# Patient Record
Sex: FEMALE | Race: ASIAN | NOT HISPANIC OR LATINO | URBAN - METROPOLITAN AREA
[De-identification: names, ages, dates, MRNs, and addresses within clinical notes are randomized per-mention and may not be internally consistent; named-entity substitution may affect disease eponyms.]

---

## 2017-01-20 ENCOUNTER — COMMUNICATION - HEALTHEAST (OUTPATIENT)
Dept: FAMILY MEDICINE | Facility: CLINIC | Age: 82
End: 2017-01-20

## 2017-01-20 DIAGNOSIS — K59.00 UNSPECIFIED CONSTIPATION: ICD-10-CM

## 2017-02-12 ENCOUNTER — COMMUNICATION - HEALTHEAST (OUTPATIENT)
Dept: FAMILY MEDICINE | Facility: CLINIC | Age: 82
End: 2017-02-12

## 2017-02-21 ENCOUNTER — OFFICE VISIT - HEALTHEAST (OUTPATIENT)
Dept: FAMILY MEDICINE | Facility: CLINIC | Age: 82
End: 2017-02-21

## 2017-02-21 DIAGNOSIS — R50.9 FEVER: ICD-10-CM

## 2017-02-21 DIAGNOSIS — R11.0 NAUSEA: ICD-10-CM

## 2017-02-21 DIAGNOSIS — K52.9 GASTROENTERITIS: ICD-10-CM

## 2017-02-21 DIAGNOSIS — B34.9 VIRAL INFECTION: ICD-10-CM

## 2017-02-21 ASSESSMENT — MIFFLIN-ST. JEOR: SCORE: 676.75

## 2017-02-23 ENCOUNTER — OFFICE VISIT - HEALTHEAST (OUTPATIENT)
Dept: FAMILY MEDICINE | Facility: CLINIC | Age: 82
End: 2017-02-23

## 2017-02-23 DIAGNOSIS — I10 HTN (HYPERTENSION): ICD-10-CM

## 2017-02-23 DIAGNOSIS — K52.9 GASTROENTERITIS: ICD-10-CM

## 2017-02-23 DIAGNOSIS — R11.0 NAUSEA: ICD-10-CM

## 2017-02-23 ASSESSMENT — MIFFLIN-ST. JEOR: SCORE: 682.19

## 2017-03-23 ENCOUNTER — OFFICE VISIT - HEALTHEAST (OUTPATIENT)
Dept: FAMILY MEDICINE | Facility: CLINIC | Age: 82
End: 2017-03-23

## 2017-03-23 DIAGNOSIS — D64.9 ANEMIA, UNSPECIFIED: ICD-10-CM

## 2017-03-23 DIAGNOSIS — R11.0 NAUSEA: ICD-10-CM

## 2017-03-23 DIAGNOSIS — N18.5: ICD-10-CM

## 2017-03-23 DIAGNOSIS — I10 HTN (HYPERTENSION): ICD-10-CM

## 2017-03-23 ASSESSMENT — MIFFLIN-ST. JEOR: SCORE: 675.15

## 2017-03-28 ENCOUNTER — COMMUNICATION - HEALTHEAST (OUTPATIENT)
Dept: NURSING | Facility: CLINIC | Age: 82
End: 2017-03-28

## 2017-03-30 ENCOUNTER — RECORDS - HEALTHEAST (OUTPATIENT)
Dept: ADMINISTRATIVE | Facility: OTHER | Age: 82
End: 2017-03-30

## 2017-04-03 ENCOUNTER — COMMUNICATION - HEALTHEAST (OUTPATIENT)
Dept: FAMILY MEDICINE | Facility: CLINIC | Age: 82
End: 2017-04-03

## 2017-04-03 DIAGNOSIS — R51.9 HEADACHE: ICD-10-CM

## 2017-04-06 ENCOUNTER — COMMUNICATION - HEALTHEAST (OUTPATIENT)
Dept: FAMILY MEDICINE | Facility: CLINIC | Age: 82
End: 2017-04-06

## 2017-04-06 RX ORDER — OMEGA-3S/DHA/EPA/FISH OIL/D3 300MG-1000
2000 CAPSULE ORAL DAILY
Status: SHIPPED | COMMUNITY
Start: 2017-04-06

## 2017-04-12 ENCOUNTER — COMMUNICATION - HEALTHEAST (OUTPATIENT)
Dept: FAMILY MEDICINE | Facility: CLINIC | Age: 82
End: 2017-04-12

## 2017-04-12 DIAGNOSIS — M54.50 LUMBAGO: ICD-10-CM

## 2017-04-20 ENCOUNTER — COMMUNICATION - HEALTHEAST (OUTPATIENT)
Dept: NURSING | Facility: CLINIC | Age: 82
End: 2017-04-20

## 2017-04-20 ENCOUNTER — COMMUNICATION - HEALTHEAST (OUTPATIENT)
Dept: FAMILY MEDICINE | Facility: CLINIC | Age: 82
End: 2017-04-20

## 2017-05-03 ENCOUNTER — COMMUNICATION - HEALTHEAST (OUTPATIENT)
Dept: FAMILY MEDICINE | Facility: CLINIC | Age: 82
End: 2017-05-03

## 2017-06-07 ENCOUNTER — COMMUNICATION - HEALTHEAST (OUTPATIENT)
Dept: FAMILY MEDICINE | Facility: CLINIC | Age: 82
End: 2017-06-07

## 2017-06-07 DIAGNOSIS — L85.3 DRY SKIN: ICD-10-CM

## 2017-06-12 ENCOUNTER — COMMUNICATION - HEALTHEAST (OUTPATIENT)
Dept: FAMILY MEDICINE | Facility: CLINIC | Age: 82
End: 2017-06-12

## 2017-06-13 ENCOUNTER — AMBULATORY - HEALTHEAST (OUTPATIENT)
Dept: FAMILY MEDICINE | Facility: CLINIC | Age: 82
End: 2017-06-13

## 2017-06-17 ENCOUNTER — COMMUNICATION - HEALTHEAST (OUTPATIENT)
Dept: FAMILY MEDICINE | Facility: CLINIC | Age: 82
End: 2017-06-17

## 2017-07-03 ENCOUNTER — COMMUNICATION - HEALTHEAST (OUTPATIENT)
Dept: FAMILY MEDICINE | Facility: CLINIC | Age: 82
End: 2017-07-03

## 2017-07-03 ENCOUNTER — OFFICE VISIT - HEALTHEAST (OUTPATIENT)
Dept: FAMILY MEDICINE | Facility: CLINIC | Age: 82
End: 2017-07-03

## 2017-07-03 DIAGNOSIS — D64.9 ANEMIA, UNSPECIFIED: ICD-10-CM

## 2017-07-03 DIAGNOSIS — I10 HTN (HYPERTENSION): ICD-10-CM

## 2017-07-03 DIAGNOSIS — N28.9 MALNUTRITION DUE TO RENAL DISEASE (H): ICD-10-CM

## 2017-07-03 DIAGNOSIS — M25.50 JOINT PAIN: ICD-10-CM

## 2017-07-03 DIAGNOSIS — M81.0 OSTEOPOROSIS: ICD-10-CM

## 2017-07-03 DIAGNOSIS — K59.00 CONSTIPATION: ICD-10-CM

## 2017-07-03 DIAGNOSIS — N18.5: ICD-10-CM

## 2017-07-03 DIAGNOSIS — R14.0 GASSINESS: ICD-10-CM

## 2017-07-03 DIAGNOSIS — K52.9 GASTROENTERITIS: ICD-10-CM

## 2017-07-03 DIAGNOSIS — E46 MALNUTRITION DUE TO RENAL DISEASE (H): ICD-10-CM

## 2017-07-03 ASSESSMENT — MIFFLIN-ST. JEOR: SCORE: 664.95

## 2017-07-20 ENCOUNTER — COMMUNICATION - HEALTHEAST (OUTPATIENT)
Dept: FAMILY MEDICINE | Facility: CLINIC | Age: 82
End: 2017-07-20

## 2017-08-07 ENCOUNTER — AMBULATORY - HEALTHEAST (OUTPATIENT)
Dept: FAMILY MEDICINE | Facility: CLINIC | Age: 82
End: 2017-08-07

## 2017-08-07 ENCOUNTER — COMMUNICATION - HEALTHEAST (OUTPATIENT)
Dept: FAMILY MEDICINE | Facility: CLINIC | Age: 82
End: 2017-08-07

## 2017-08-07 DIAGNOSIS — L85.3 DRY SKIN: ICD-10-CM

## 2017-08-07 DIAGNOSIS — R51.9 HEADACHE: ICD-10-CM

## 2017-08-11 ENCOUNTER — COMMUNICATION - HEALTHEAST (OUTPATIENT)
Dept: FAMILY MEDICINE | Facility: CLINIC | Age: 82
End: 2017-08-11

## 2017-08-11 DIAGNOSIS — L85.3 DRY SKIN: ICD-10-CM

## 2017-08-20 ENCOUNTER — COMMUNICATION - HEALTHEAST (OUTPATIENT)
Dept: FAMILY MEDICINE | Facility: CLINIC | Age: 82
End: 2017-08-20

## 2017-08-22 ENCOUNTER — RECORDS - HEALTHEAST (OUTPATIENT)
Dept: ADMINISTRATIVE | Facility: OTHER | Age: 82
End: 2017-08-22

## 2017-09-11 ENCOUNTER — OFFICE VISIT - HEALTHEAST (OUTPATIENT)
Dept: FAMILY MEDICINE | Facility: CLINIC | Age: 82
End: 2017-09-11

## 2017-09-11 DIAGNOSIS — R14.0 GASSINESS: ICD-10-CM

## 2017-09-11 DIAGNOSIS — N18.5: ICD-10-CM

## 2017-09-11 DIAGNOSIS — H04.123 DRY EYES: ICD-10-CM

## 2017-09-11 DIAGNOSIS — I10 HTN (HYPERTENSION): ICD-10-CM

## 2017-09-11 DIAGNOSIS — H57.9 ITCHY EYES: ICD-10-CM

## 2017-09-11 DIAGNOSIS — K59.00 CONSTIPATION: ICD-10-CM

## 2017-09-11 DIAGNOSIS — D64.9 ANEMIA, UNSPECIFIED: ICD-10-CM

## 2017-09-11 DIAGNOSIS — Z23 NEED FOR IMMUNIZATION AGAINST INFLUENZA: ICD-10-CM

## 2017-09-11 ASSESSMENT — MIFFLIN-ST. JEOR: SCORE: 661.77

## 2017-09-25 ENCOUNTER — COMMUNICATION - HEALTHEAST (OUTPATIENT)
Dept: FAMILY MEDICINE | Facility: CLINIC | Age: 82
End: 2017-09-25

## 2017-09-28 ENCOUNTER — COMMUNICATION - HEALTHEAST (OUTPATIENT)
Dept: NURSING | Facility: CLINIC | Age: 82
End: 2017-09-28

## 2017-10-02 ENCOUNTER — AMBULATORY - HEALTHEAST (OUTPATIENT)
Dept: CARE COORDINATION | Facility: CLINIC | Age: 82
End: 2017-10-02

## 2017-10-03 ENCOUNTER — RECORDS - HEALTHEAST (OUTPATIENT)
Dept: ADMINISTRATIVE | Facility: OTHER | Age: 82
End: 2017-10-03

## 2017-11-11 ENCOUNTER — COMMUNICATION - HEALTHEAST (OUTPATIENT)
Dept: FAMILY MEDICINE | Facility: CLINIC | Age: 82
End: 2017-11-11

## 2017-11-11 DIAGNOSIS — M81.0 OSTEOPOROSIS: ICD-10-CM

## 2017-11-11 DIAGNOSIS — R14.0 GASSINESS: ICD-10-CM

## 2017-11-13 ENCOUNTER — RECORDS - HEALTHEAST (OUTPATIENT)
Dept: ADMINISTRATIVE | Facility: OTHER | Age: 82
End: 2017-11-13

## 2017-12-07 ENCOUNTER — COMMUNICATION - HEALTHEAST (OUTPATIENT)
Dept: FAMILY MEDICINE | Facility: CLINIC | Age: 82
End: 2017-12-07

## 2017-12-07 DIAGNOSIS — R51.9 HEADACHE: ICD-10-CM

## 2017-12-11 ENCOUNTER — OFFICE VISIT - HEALTHEAST (OUTPATIENT)
Dept: FAMILY MEDICINE | Facility: CLINIC | Age: 82
End: 2017-12-11

## 2017-12-11 DIAGNOSIS — E55.9 VITAMIN D DEFICIENCY DISEASE: ICD-10-CM

## 2017-12-11 DIAGNOSIS — E46 MALNUTRITION DUE TO RENAL DISEASE (H): ICD-10-CM

## 2017-12-11 DIAGNOSIS — I10 HTN (HYPERTENSION): ICD-10-CM

## 2017-12-11 DIAGNOSIS — H57.9 ITCHY EYES: ICD-10-CM

## 2017-12-11 DIAGNOSIS — M81.0 OSTEOPOROSIS: ICD-10-CM

## 2017-12-11 DIAGNOSIS — N18.5: ICD-10-CM

## 2017-12-11 DIAGNOSIS — R14.0 GASSINESS: ICD-10-CM

## 2017-12-11 DIAGNOSIS — N18.5 STAGE 5 CHRONIC KIDNEY DISEASE NOT ON CHRONIC DIALYSIS (H): ICD-10-CM

## 2017-12-11 DIAGNOSIS — N28.9 MALNUTRITION DUE TO RENAL DISEASE (H): ICD-10-CM

## 2017-12-11 ASSESSMENT — MIFFLIN-ST. JEOR: SCORE: 662.23

## 2017-12-18 ENCOUNTER — RECORDS - HEALTHEAST (OUTPATIENT)
Dept: ADMINISTRATIVE | Facility: OTHER | Age: 82
End: 2017-12-18

## 2018-01-01 ENCOUNTER — OFFICE VISIT - HEALTHEAST (OUTPATIENT)
Dept: FAMILY MEDICINE | Facility: CLINIC | Age: 83
End: 2018-01-01

## 2018-01-01 ENCOUNTER — COMMUNICATION - HEALTHEAST (OUTPATIENT)
Dept: FAMILY MEDICINE | Facility: CLINIC | Age: 83
End: 2018-01-01

## 2018-01-01 ENCOUNTER — RECORDS - HEALTHEAST (OUTPATIENT)
Dept: ADMINISTRATIVE | Facility: OTHER | Age: 83
End: 2018-01-01

## 2018-01-01 DIAGNOSIS — I10 ESSENTIAL HYPERTENSION: ICD-10-CM

## 2018-01-01 DIAGNOSIS — N18.5 STAGE 5 CHRONIC KIDNEY DISEASE NOT ON CHRONIC DIALYSIS (H): ICD-10-CM

## 2018-01-01 DIAGNOSIS — R41.3 MEMORY PROBLEM: ICD-10-CM

## 2018-01-01 DIAGNOSIS — M81.0 OSTEOPOROSIS: ICD-10-CM

## 2018-01-01 DIAGNOSIS — E46 MALNUTRITION DUE TO RENAL DISEASE (H): ICD-10-CM

## 2018-01-01 DIAGNOSIS — L85.3 DRY SKIN: ICD-10-CM

## 2018-01-01 DIAGNOSIS — N28.9 MALNUTRITION DUE TO RENAL DISEASE (H): ICD-10-CM

## 2018-01-01 DIAGNOSIS — L29.9 ITCHY SKIN: ICD-10-CM

## 2018-01-01 ASSESSMENT — MIFFLIN-ST. JEOR: SCORE: 677.29

## 2018-01-18 ENCOUNTER — COMMUNICATION - HEALTHEAST (OUTPATIENT)
Dept: FAMILY MEDICINE | Facility: CLINIC | Age: 83
End: 2018-01-18

## 2018-01-18 DIAGNOSIS — M54.50 LUMBAGO: ICD-10-CM

## 2018-02-05 ENCOUNTER — OFFICE VISIT - HEALTHEAST (OUTPATIENT)
Dept: FAMILY MEDICINE | Facility: CLINIC | Age: 83
End: 2018-02-05

## 2018-02-05 DIAGNOSIS — E46 MALNUTRITION DUE TO RENAL DISEASE (H): ICD-10-CM

## 2018-02-05 DIAGNOSIS — I10 HTN (HYPERTENSION): ICD-10-CM

## 2018-02-05 DIAGNOSIS — M81.0 OSTEOPOROSIS: ICD-10-CM

## 2018-02-05 DIAGNOSIS — H57.9 ITCHY EYES: ICD-10-CM

## 2018-02-05 DIAGNOSIS — E55.9 VITAMIN D DEFICIENCY DISEASE: ICD-10-CM

## 2018-02-05 DIAGNOSIS — R14.0 GASSINESS: ICD-10-CM

## 2018-02-05 DIAGNOSIS — K59.00 CONSTIPATION: ICD-10-CM

## 2018-02-05 DIAGNOSIS — R63.0 POOR APPETITE: ICD-10-CM

## 2018-02-05 DIAGNOSIS — N28.9 MALNUTRITION DUE TO RENAL DISEASE (H): ICD-10-CM

## 2018-02-05 DIAGNOSIS — N18.5: ICD-10-CM

## 2018-02-05 DIAGNOSIS — N19 RENAL FAILURE: ICD-10-CM

## 2018-02-05 DIAGNOSIS — N18.5 STAGE 5 CHRONIC KIDNEY DISEASE NOT ON CHRONIC DIALYSIS (H): ICD-10-CM

## 2018-02-05 ASSESSMENT — MIFFLIN-ST. JEOR: SCORE: 659.5

## 2018-02-06 ENCOUNTER — RECORDS - HEALTHEAST (OUTPATIENT)
Dept: ADMINISTRATIVE | Facility: OTHER | Age: 83
End: 2018-02-06

## 2018-03-07 ENCOUNTER — RECORDS - HEALTHEAST (OUTPATIENT)
Dept: ADMINISTRATIVE | Facility: OTHER | Age: 83
End: 2018-03-07

## 2018-03-19 ENCOUNTER — COMMUNICATION - HEALTHEAST (OUTPATIENT)
Dept: SCHEDULING | Facility: CLINIC | Age: 83
End: 2018-03-19

## 2018-03-20 ENCOUNTER — AMBULATORY - HEALTHEAST (OUTPATIENT)
Dept: FAMILY MEDICINE | Facility: CLINIC | Age: 83
End: 2018-03-20

## 2018-04-03 ENCOUNTER — COMMUNICATION - HEALTHEAST (OUTPATIENT)
Dept: FAMILY MEDICINE | Facility: CLINIC | Age: 83
End: 2018-04-03

## 2018-04-03 DIAGNOSIS — L85.3 DRY SKIN: ICD-10-CM

## 2018-04-03 DIAGNOSIS — R51.9 HEADACHE: ICD-10-CM

## 2018-04-05 ENCOUNTER — OFFICE VISIT - HEALTHEAST (OUTPATIENT)
Dept: FAMILY MEDICINE | Facility: CLINIC | Age: 83
End: 2018-04-05

## 2018-04-05 ENCOUNTER — AMBULATORY - HEALTHEAST (OUTPATIENT)
Dept: FAMILY MEDICINE | Facility: CLINIC | Age: 83
End: 2018-04-05

## 2018-04-05 DIAGNOSIS — I10 HTN (HYPERTENSION): ICD-10-CM

## 2018-04-05 DIAGNOSIS — L85.3 DRY SKIN: ICD-10-CM

## 2018-04-05 DIAGNOSIS — R41.3 MEMORY PROBLEM: ICD-10-CM

## 2018-04-05 DIAGNOSIS — R14.0 GASSINESS: ICD-10-CM

## 2018-04-05 DIAGNOSIS — R51.9 HEADACHE: ICD-10-CM

## 2018-04-05 DIAGNOSIS — M81.0 OSTEOPOROSIS: ICD-10-CM

## 2018-04-05 DIAGNOSIS — E46 MALNUTRITION DUE TO RENAL DISEASE (H): ICD-10-CM

## 2018-04-05 DIAGNOSIS — N28.9 MALNUTRITION DUE TO RENAL DISEASE (H): ICD-10-CM

## 2018-04-05 DIAGNOSIS — E55.9 VITAMIN D DEFICIENCY DISEASE: ICD-10-CM

## 2018-04-05 DIAGNOSIS — N18.5 STAGE 5 CHRONIC KIDNEY DISEASE NOT ON CHRONIC DIALYSIS (H): ICD-10-CM

## 2018-04-05 ASSESSMENT — MIFFLIN-ST. JEOR: SCORE: 668.12

## 2018-04-24 ENCOUNTER — COMMUNICATION - HEALTHEAST (OUTPATIENT)
Dept: FAMILY MEDICINE | Facility: CLINIC | Age: 83
End: 2018-04-24

## 2018-05-09 ENCOUNTER — RECORDS - HEALTHEAST (OUTPATIENT)
Dept: ADMINISTRATIVE | Facility: OTHER | Age: 83
End: 2018-05-09

## 2018-05-14 ENCOUNTER — COMMUNICATION - HEALTHEAST (OUTPATIENT)
Dept: FAMILY MEDICINE | Facility: CLINIC | Age: 83
End: 2018-05-14

## 2018-05-22 ENCOUNTER — COMMUNICATION - HEALTHEAST (OUTPATIENT)
Dept: FAMILY MEDICINE | Facility: CLINIC | Age: 83
End: 2018-05-22

## 2018-05-31 ENCOUNTER — OFFICE VISIT - HEALTHEAST (OUTPATIENT)
Dept: FAMILY MEDICINE | Facility: CLINIC | Age: 83
End: 2018-05-31

## 2018-05-31 DIAGNOSIS — I10 HTN (HYPERTENSION): ICD-10-CM

## 2018-05-31 DIAGNOSIS — M81.0 OSTEOPOROSIS: ICD-10-CM

## 2018-05-31 DIAGNOSIS — N18.5 STAGE 5 CHRONIC KIDNEY DISEASE NOT ON CHRONIC DIALYSIS (H): ICD-10-CM

## 2018-05-31 ASSESSMENT — MIFFLIN-ST. JEOR: SCORE: 660.78

## 2018-06-18 ENCOUNTER — COMMUNICATION - HEALTHEAST (OUTPATIENT)
Dept: FAMILY MEDICINE | Facility: CLINIC | Age: 83
End: 2018-06-18

## 2018-07-10 ENCOUNTER — COMMUNICATION - HEALTHEAST (OUTPATIENT)
Dept: FAMILY MEDICINE | Facility: CLINIC | Age: 83
End: 2018-07-10

## 2018-08-04 ENCOUNTER — COMMUNICATION - HEALTHEAST (OUTPATIENT)
Dept: FAMILY MEDICINE | Facility: CLINIC | Age: 83
End: 2018-08-04

## 2018-08-04 DIAGNOSIS — N28.9 MALNUTRITION DUE TO RENAL DISEASE (H): ICD-10-CM

## 2018-08-04 DIAGNOSIS — N18.5: ICD-10-CM

## 2018-08-04 DIAGNOSIS — E46 MALNUTRITION DUE TO RENAL DISEASE (H): ICD-10-CM

## 2018-08-12 ENCOUNTER — COMMUNICATION - HEALTHEAST (OUTPATIENT)
Dept: FAMILY MEDICINE | Facility: CLINIC | Age: 83
End: 2018-08-12

## 2018-08-12 DIAGNOSIS — I10 HTN (HYPERTENSION): ICD-10-CM

## 2018-09-04 ENCOUNTER — OFFICE VISIT - HEALTHEAST (OUTPATIENT)
Dept: FAMILY MEDICINE | Facility: CLINIC | Age: 83
End: 2018-09-04

## 2018-09-04 DIAGNOSIS — E46 MALNUTRITION DUE TO RENAL DISEASE (H): ICD-10-CM

## 2018-09-04 DIAGNOSIS — N18.5: ICD-10-CM

## 2018-09-04 DIAGNOSIS — I10 HTN (HYPERTENSION): ICD-10-CM

## 2018-09-04 DIAGNOSIS — R41.3 MEMORY PROBLEM: ICD-10-CM

## 2018-09-04 DIAGNOSIS — Z23 NEED FOR IMMUNIZATION AGAINST INFLUENZA: ICD-10-CM

## 2018-09-04 DIAGNOSIS — M81.0 OSTEOPOROSIS: ICD-10-CM

## 2018-09-04 DIAGNOSIS — E55.9 VITAMIN D DEFICIENCY DISEASE: ICD-10-CM

## 2018-09-04 DIAGNOSIS — N28.9 MALNUTRITION DUE TO RENAL DISEASE (H): ICD-10-CM

## 2018-09-04 ASSESSMENT — MIFFLIN-ST. JEOR: SCORE: 661.54

## 2018-09-18 ENCOUNTER — RECORDS - HEALTHEAST (OUTPATIENT)
Dept: ADMINISTRATIVE | Facility: OTHER | Age: 83
End: 2018-09-18

## 2018-10-23 ENCOUNTER — COMMUNICATION - HEALTHEAST (OUTPATIENT)
Dept: FAMILY MEDICINE | Facility: CLINIC | Age: 83
End: 2018-10-23

## 2018-10-23 ENCOUNTER — OFFICE VISIT - HEALTHEAST (OUTPATIENT)
Dept: FAMILY MEDICINE | Facility: CLINIC | Age: 83
End: 2018-10-23

## 2018-10-23 DIAGNOSIS — R41.3 MEMORY PROBLEM: ICD-10-CM

## 2018-10-23 DIAGNOSIS — N18.5 STAGE 5 CHRONIC KIDNEY DISEASE NOT ON CHRONIC DIALYSIS (H): ICD-10-CM

## 2018-10-23 DIAGNOSIS — M81.0 OSTEOPOROSIS: ICD-10-CM

## 2018-10-23 DIAGNOSIS — N28.9 MALNUTRITION DUE TO RENAL DISEASE (H): ICD-10-CM

## 2018-10-23 DIAGNOSIS — R14.0 GASSINESS: ICD-10-CM

## 2018-10-23 DIAGNOSIS — E46 MALNUTRITION DUE TO RENAL DISEASE (H): ICD-10-CM

## 2018-10-23 RX ORDER — SIMETHICONE 80 MG
TABLET,CHEWABLE ORAL
Qty: 100 TABLET | Refills: 6 | Status: SHIPPED | OUTPATIENT
Start: 2018-10-23

## 2018-10-23 ASSESSMENT — MIFFLIN-ST. JEOR: SCORE: 674.56

## 2018-11-13 ENCOUNTER — RECORDS - HEALTHEAST (OUTPATIENT)
Dept: ADMINISTRATIVE | Facility: OTHER | Age: 83
End: 2018-11-13

## 2019-01-01 ENCOUNTER — AMBULATORY - HEALTHEAST (OUTPATIENT)
Dept: FAMILY MEDICINE | Facility: CLINIC | Age: 84
End: 2019-01-01

## 2019-01-01 ENCOUNTER — COMMUNICATION - HEALTHEAST (OUTPATIENT)
Dept: NURSING | Facility: CLINIC | Age: 84
End: 2019-01-01

## 2019-01-01 ENCOUNTER — COMMUNICATION - HEALTHEAST (OUTPATIENT)
Dept: CARE COORDINATION | Facility: CLINIC | Age: 84
End: 2019-01-01

## 2019-01-01 ENCOUNTER — COMMUNICATION - HEALTHEAST (OUTPATIENT)
Dept: GERIATRIC MEDICINE | Facility: CLINIC | Age: 84
End: 2019-01-01

## 2019-01-01 ENCOUNTER — COMMUNICATION - HEALTHEAST (OUTPATIENT)
Dept: FAMILY MEDICINE | Facility: CLINIC | Age: 84
End: 2019-01-01

## 2019-01-01 ENCOUNTER — OFFICE VISIT - HEALTHEAST (OUTPATIENT)
Dept: FAMILY MEDICINE | Facility: CLINIC | Age: 84
End: 2019-01-01

## 2019-01-01 ENCOUNTER — HOME CARE/HOSPICE - HEALTHEAST (OUTPATIENT)
Dept: HOSPICE | Facility: HOSPICE | Age: 84
End: 2019-01-01

## 2019-01-01 ENCOUNTER — RECORDS - HEALTHEAST (OUTPATIENT)
Dept: ADMINISTRATIVE | Facility: OTHER | Age: 84
End: 2019-01-01

## 2019-01-01 ENCOUNTER — AMBULATORY - HEALTHEAST (OUTPATIENT)
Dept: OTHER | Facility: CLINIC | Age: 84
End: 2019-01-01

## 2019-01-01 DIAGNOSIS — Z78.9 MEDICALLY COMPLEX PATIENT: ICD-10-CM

## 2019-01-01 DIAGNOSIS — Z28.39 IMMUNIZATION DEFICIENCY: ICD-10-CM

## 2019-01-01 DIAGNOSIS — N18.5 STAGE 5 CHRONIC KIDNEY DISEASE NOT ON CHRONIC DIALYSIS (H): ICD-10-CM

## 2019-01-01 DIAGNOSIS — I10 ESSENTIAL HYPERTENSION: ICD-10-CM

## 2019-01-01 DIAGNOSIS — R07.9 CHEST PAIN, UNSPECIFIED TYPE: ICD-10-CM

## 2019-01-01 DIAGNOSIS — E46 MALNUTRITION DUE TO RENAL DISEASE (H): ICD-10-CM

## 2019-01-01 DIAGNOSIS — M81.0 OSTEOPOROSIS: ICD-10-CM

## 2019-01-01 DIAGNOSIS — N18.5: ICD-10-CM

## 2019-01-01 DIAGNOSIS — N28.9 MALNUTRITION DUE TO RENAL DISEASE (H): ICD-10-CM

## 2019-01-01 DIAGNOSIS — R53.83 OTHER FATIGUE: ICD-10-CM

## 2019-01-01 DIAGNOSIS — M54.50 LUMBAGO: ICD-10-CM

## 2019-01-01 DIAGNOSIS — M62.81 GENERALIZED MUSCLE WEAKNESS: ICD-10-CM

## 2019-01-01 DIAGNOSIS — Z59.6 POVERTY: ICD-10-CM

## 2019-01-01 DIAGNOSIS — N19 RENAL FAILURE, UNSPECIFIED CHRONICITY: ICD-10-CM

## 2019-01-01 DIAGNOSIS — Z09 HOSPITAL DISCHARGE FOLLOW-UP: ICD-10-CM

## 2019-01-01 DIAGNOSIS — Z75.8 LANGUAGE BARRIER AFFECTING HEALTH CARE: ICD-10-CM

## 2019-01-01 DIAGNOSIS — M81.0 OSTEOPOROSIS WITHOUT CURRENT PATHOLOGICAL FRACTURE, UNSPECIFIED OSTEOPOROSIS TYPE: ICD-10-CM

## 2019-01-01 DIAGNOSIS — N18.6 END STAGE RENAL DISEASE (H): ICD-10-CM

## 2019-01-01 DIAGNOSIS — Z60.3 LANGUAGE BARRIER AFFECTING HEALTH CARE: ICD-10-CM

## 2019-01-01 DIAGNOSIS — L29.9 PRURITIC DISORDER: ICD-10-CM

## 2019-01-01 DIAGNOSIS — R63.0 POOR APPETITE: ICD-10-CM

## 2019-01-01 DIAGNOSIS — Z91.81 AT HIGH RISK FOR INJURY RELATED TO FALL: ICD-10-CM

## 2019-01-01 DIAGNOSIS — R51.9 HEADACHE: ICD-10-CM

## 2019-01-01 RX ORDER — TRIAMCINOLONE ACETONIDE 1 MG/G
CREAM TOPICAL
Qty: 80 G | Refills: 0 | Status: SHIPPED | OUTPATIENT
Start: 2019-01-01

## 2019-01-01 RX ORDER — GABAPENTIN 100 MG/1
100 CAPSULE ORAL AT BEDTIME
Qty: 90 CAPSULE | Refills: 3 | Status: SHIPPED | OUTPATIENT
Start: 2019-01-01

## 2019-01-01 RX ORDER — SODIUM BICARBONATE 650 MG/1
1300 TABLET ORAL 2 TIMES DAILY
Qty: 60 TABLET | Refills: 11 | Status: SHIPPED | OUTPATIENT
Start: 2019-01-01

## 2019-01-01 RX ORDER — CARVEDILOL 3.12 MG/1
3.12 TABLET ORAL 2 TIMES DAILY
Qty: 60 TABLET | Refills: 11 | Status: SHIPPED | OUTPATIENT
Start: 2019-01-01

## 2019-01-01 RX ORDER — ACETAMINOPHEN 500 MG
TABLET ORAL
Qty: 100 TABLET | Refills: 6 | Status: SHIPPED | OUTPATIENT
Start: 2019-01-01

## 2019-01-01 RX ORDER — SODIUM BICARBONATE 650 MG/1
TABLET ORAL
Qty: 60 TABLET | Refills: 0 | Status: SHIPPED | OUTPATIENT
Start: 2019-01-01

## 2019-01-01 RX ORDER — CALCITRIOL 0.25 UG/1
CAPSULE, LIQUID FILLED ORAL
Qty: 8 CAPSULE | Refills: 11 | Status: SHIPPED | OUTPATIENT
Start: 2019-01-01

## 2019-01-01 SDOH — SOCIAL STABILITY - SOCIAL INSECURITY: ACCULTURATION DIFFICULTY: Z60.3

## 2019-01-01 SDOH — ECONOMIC STABILITY - INCOME SECURITY: LOW INCOME: Z59.6

## 2019-01-01 ASSESSMENT — MIFFLIN-ST. JEOR
SCORE: 683.78
SCORE: 681.14
SCORE: 668.21

## 2019-01-01 ASSESSMENT — ACTIVITIES OF DAILY LIVING (ADL)
DEPENDENT_IADLS:: CLEANING;COOKING;LAUNDRY;MEAL PREPARATION;MEDICATION MANAGEMENT;MONEY MANAGEMENT;TRANSPORTATION;INCONTINENCE

## 2021-05-27 NOTE — PROGRESS NOTES
"ASSESMENT AND PLAN:  Diagnoses and all orders for this visit:    Chest pain, unspecified type  Now resolved, likely gastrointestinal.  Will use simethicone if needed if recurrence.  We discussed indications for follow-up.  We are checking a chest x-ray as ordered below per the request of the patient's nephrologist.  When radiology report is available will be faxed to the nephrologist.  -     XR Chest 2 Views done today and reviewed by me personally shows some mild hyperexpansion and mild peribronchial prominence but otherwise looks normal.    Chronic kidney failure, stage 5 (H)  Continue to follow-up with her nephrology clinic and has follow-up appointment scheduled with her usual primary care physician Dr. Sherman coming up in June.    Immunization deficiency  Medication review and counseling done.  Patient and family would like her to proceed with shingles vaccination.  -     Varicella Zoster, Recombinant Vaccine IM          SUBJECTIVE: 85-year-old female here to be evaluated for chest pain.  Clinic had gotten a faxed order for chest x-ray from her nephrologist.  The indication listed was chest pain.  That nephrology evaluation is not yet available.  Patient does have a history of stage V chronic kidney disease and follows up regularly with the nephrology clinic.  The patient reports that she had been having a pressure and burning sensation in her upper abdomen with some radiation up into her chest last week, her son reports that he thought it was more of an upper abdominal \"gas\" related symptom and that it did relieve after about 15 minutes when she took some simethicone.  Patient denies any exertional chest pain or exertional trigger of the above symptoms.  She currently has no chest pain or pressure or burning.  No shortness of breath.  No near syncope.  She has not been getting any fevers or vomiting or blood in the stool or black tarry stools.    Past Medical History:   Diagnosis Date     Anemia      Cataract "      Lower back pain      Menopause      Renal failure      Patient Active Problem List   Diagnosis     Menopause     Decrease In Appetite     Anemia     Cataract     Constipation     Latent Tuberculosis     Improper Nutrition     Blurry Vision     Renal Failure     Lower Back Pain     Dizziness     Fatigue     Serum Enzyme Levels - Alkaline Phosphatase Elevated     Itching (Pruritus)     Multiple Nonvenomous Insect Bites     Xerosis Cutis     Bloating     Myalgia and myositis, unspecified     Malnutrition due to renal disease (H)     Poor appetite     Headache     Chronic kidney failure, stage 5 (H)     HTN (hypertension)     Gastroenteritis     Osteoporosis     Joint pain     Stage 5 chronic kidney disease not on chronic dialysis (H)     Gassiness     Current Outpatient Medications   Medication Sig Dispense Refill     acetaminophen (TYLENOL) 500 MG tablet TAKE 1 TABLET (500MG TOTAL) BY MOUTH EVERY SIX HOURS AS NEEDED FOR PAIN. 100 tablet 6     ammonium lactate (LAC-HYDRIN) 12 % lotion APPLY TO DRY AREAS DAILY AS NEEDED 500 g 6     artificial tears, hypromellose, (GONIOVISC) 2.5 % ophthalmic solution 2 drops each eye as needed 15 mL 12     atenolol (TENORMIN) 100 MG tablet Take 1 tablet (100 mg total) by mouth daily. 30 tablet 11     B complex with C#20-folic acid (RENAL CAPS) 1 mg cap capsule Take 1 capsule by mouth daily. 30 capsule 11     calcitriol (ROCALTROL) 0.25 MCG capsule TAKE ONE CAPSULE BY MOUTH ON MONDAYS AND THURSDAYS 8 capsule 11     cholecalciferol, vitamin D3, 400 unit Tab Take 2,000 Units by mouth daily. Nephrologist Rx. 4/06/2017       food supplemt, lactose-reduced (ENSURE COMPLETE) 0.05-1.5 gram-kcal/mL Liqd Take 1 Can by mouth 2 (two) times a day. Have 1-2 daily or protein powder equivalent 60 Can 11     gabapentin (NEURONTIN) 100 MG capsule Take 100 mg by mouth at bedtime. 90 capsule 3     lidocaine (XYLOCAINE) 5 % ointment APPLY TO AFFECTED AREAS TWICE DAILY AS NEEDED FOR MUSCLE PAIN 35.44 g  "0     simethicone (GAS RELIEF) 80 MG chewable tablet CHEW 1 TABLET BY MOUTH 4 TIMES A DAY AS NEEDED FOR FLATULENCE 100 tablet 6     vit E-glycerin-dimethicone (CETAPHIL) Lotn Apply to body 1-2 x per day. 400 mL 11     No current facility-administered medications for this visit.      Social History     Tobacco Use   Smoking Status Current Some Day Smoker     Packs/day: 0.25     Types: Cigarettes   Smokeless Tobacco Never Used   Tobacco Comment    smokes 2-3 cigarettes a day       OBJECTICE: /68   Pulse 95   Temp 98.9  F (37.2  C) (Oral)   Resp 16   Ht 4' 9.52\" (1.461 m)   Wt (!) 80 lb 4 oz (36.4 kg)   SpO2 97%   BMI 17.05 kg/m       No results found for this or any previous visit (from the past 24 hour(s)).     GEN-alert, appropriate, in no apparent distress   Musculoskeletal-nontender to palpation of the chest anteriorly   CV-regular rate and rhythm with distant heart tones, no murmur heard   RESP-lungs clear to auscultation   ABDOMINAL-soft, nontender to palpation, no palpable masses organomegaly   EXTREM-warm with no ankle edema and no calf tenderness   SKIN-no ulcers or vesicles      Omero Medrano          "

## 2021-05-29 NOTE — TELEPHONE ENCOUNTER
Can you call and discuss this with the family?   Let them know about the amount needed to get covered by insurance, and ask what she actually needs.   She does have significant Malnutrition and has been slowly gaining weight with BMI still 16.2 on 9/4/18-> 16.9 today  Ensure is helping and her appetite is improving, so it would be helpful what she needs and can get covered.     Thanks,   Dr. Mago Sherman  6/3/2019

## 2021-05-29 NOTE — TELEPHONE ENCOUNTER
Patient son notified per MD note below. The patient representative verbalizes understanding of provider/CSS instructions for follow-up and continued care per provider message.

## 2021-05-29 NOTE — TELEPHONE ENCOUNTER
Who is calling:  Nichole from [facility]  Reason for Call:  Caller stated when they last did a PA for the patient's Boost Plus. Caller stated they were informed the patient uses 6-7 cans per day but when he called the patient's family on 3/26/19, he was told by the family that patient only uses 1-2 cans per day and caller needs this clarified. Caller stated a PA renewal is needed for the enteral nutrition because it has . Caller stated the family informed this came from Mago Sherman MD.     Caller stated if the 1-2 cans per day is correct then if he submits this PA to the state insurance, it will be denied because patient must be consuming at least 75% of the caloric intake. Caller is questioning if the patient even needs to be on the Boost Plus anymore if she is only consuming 1-2 cans per day.     Caller stated to please call him back about this.  Date of last appointment with primary care: 19  Okay to leave a detailed message: Yes  [phone], ext. [phone]

## 2021-05-29 NOTE — PROGRESS NOTES
HPI - 84 yo female here to f/u on several issues.     CKD stage 5  Renal consult on  4/9/19 reviewed and advised f/u in 6 weeks   - per consult note: no reversible factors and to consider hospice referral if uremic sx develop  CKD and anemia co-manage with renal   Labs 5/29/19:   CR 5.46, GFR 7, Na 134  - significant worsening  She reports her energy is the same, not confused, eating the same, no significant change in sx     Anemia -  getting epo from renal clinic     SEVERE OSTEOPOROSIS and HIGH fracture risk   s/p treatment with bisphosphonate therapy.   Not interested in  Prolia or osteoporosis consult       Vit D deficiency - taking ergo 56657 weekly      HTN - BP is wnl on atenolol 100mg with goal by renal SBP<160     Malnutrition - - BMI fluctuating some, BMI 16.2 on 9/4/18-> 17.1 on 4/11/19 -> 16.4 on 6/4/19  Ensure is helping and her appetite is improving.   Has 1-2 can of Ensure per day, not covered by insurance if less than 75% caloric intake, so family will purchase for her     Itchy skin -   Using lac-hydrin    Current Outpatient Medications   Medication Sig     acetaminophen (TYLENOL) 500 MG tablet TAKE 1 TABLET (500MG TOTAL) BY MOUTH EVERY SIX HOURS AS NEEDED FOR PAIN.     atenolol (TENORMIN) 100 MG tablet Take 1 tablet (100 mg total) by mouth daily.     B complex with C#20-folic acid (RENAL CAPS) 1 mg cap capsule Take 1 capsule by mouth daily.     calcitriol (ROCALTROL) 0.25 MCG capsule TAKE ONE CAPSULE BY MOUTH ON MONDAYS AND THURSDAYS     cholecalciferol, vitamin D3, 400 unit Tab Take 2,000 Units by mouth daily. Nephrologist Rx. 4/06/2017     gabapentin (NEURONTIN) 100 MG capsule Take 100 mg by mouth at bedtime.     simethicone (GAS RELIEF) 80 MG chewable tablet CHEW 1 TABLET BY MOUTH 4 TIMES A DAY AS NEEDED FOR FLATULENCE     ammonium lactate (LAC-HYDRIN) 12 % lotion APPLY TO DRY AREAS DAILY AS NEEDED     artificial tears, hypromellose, (GONIOVISC) 2.5 % ophthalmic solution 2 drops each eye as  "needed     food supplemt, lactose-reduced (ENSURE COMPLETE) 0.05-1.5 gram-kcal/mL Liqd Take 1 Can by mouth 2 (two) times a day. Have 1-2 daily or protein powder equivalent     lidocaine (XYLOCAINE) 5 % ointment APPLY TO AFFECTED AREAS TWICE DAILY AS NEEDED FOR MUSCLE PAIN     vit E-glycerin-dimethicone (CETAPHIL) Lotn Apply to body 1-2 x per day.     Vitals:    06/04/19 1334   BP: 120/80   Pulse: 78   Resp: 16   Temp: 98.9  F (37.2  C)   TempSrc: Oral   SpO2: 98%   Weight: (!) 77 lb 6.4 oz (35.1 kg)   Height: 4' 9.52\" (1.461 m)     PHYSICAL EXAM   General Appearance: Awake and alert, in no acute distress  HEENT: neck is supple  CV: regular rate  Resp: No respiratory distress. Breathing comfortably  Musculoskeletal: moving limbs comfortably with not deficits or deformities  Skin: no rashes noted    A/P  CKD stage 5  CKD and anemia co-manage with renal   Labs significant worsening but sx are unchanged and stable   Will have specialty  help schedule the f/u apt - due in late June 2019    Anemia -  getting epo from renal clinic     SEVERE OSTEOPOROSIS and HIGH fracture risk   No further tx      Vit D deficiency - taking ergo 66875 weekly      HTN - continue atenolol 100mg     Malnutrition - -  Ensure 1-2 can/day and family will purchase for her     Itchy skin -   Using lac-hydrin  "

## 2021-05-29 NOTE — TELEPHONE ENCOUNTER
Called adult son who said pt is only able to take 1-2 cans per day as she is eating more solid food.  6-7 cans is too much and would go to waste.  CMT explained that pt is gaining wgt slowly and the Ensure is helping but family is adamant that pt only needs 1-2 cans per day and that PCP agreed?      If this is the case as adult son states, then a PA needs to be started for Ensure.  Thanks.

## 2021-05-29 NOTE — TELEPHONE ENCOUNTER
That is fine that she only uses 1-2 cans of ensure a day. But this means they will have to buy them. Insurance will not pay for them and PA will not help. This is not a problem, they just need to know that will need to purchase them from the store themselves.     Dr. Mago Sherman  6/3/2019

## 2021-05-29 NOTE — TELEPHONE ENCOUNTER
This message can wait for PCP return on Tuesday, May 28 per Dr. Monae.  Thanks.       PCP - please see message from Nichole from [facility]. Thanks.

## 2021-05-30 VITALS — BODY MASS INDEX: 16.44 KG/M2 | HEIGHT: 58 IN | WEIGHT: 78.3 LBS

## 2021-05-30 VITALS — WEIGHT: 79 LBS | HEIGHT: 58 IN | BODY MASS INDEX: 16.58 KG/M2

## 2021-05-30 VITALS — WEIGHT: 79.5 LBS | BODY MASS INDEX: 16.69 KG/M2 | HEIGHT: 58 IN

## 2021-05-30 NOTE — TELEPHONE ENCOUNTER
Who is calling:  [facility] [employer]  [facility]   Reason for Call:  Caller states that they faxed on 7/17/19 form called prescription \ certificate of medical necessity . They did not received any replay back . Caller requested to fax completed form to Fax # [phone].  Date of last appointment with primary care: 6/4/19  Okay to leave a detailed message: No

## 2021-05-31 VITALS — WEIGHT: 75 LBS | BODY MASS INDEX: 15.74 KG/M2 | HEIGHT: 58 IN

## 2021-05-31 VITALS — HEIGHT: 58 IN | BODY MASS INDEX: 15.6 KG/M2 | WEIGHT: 74.3 LBS

## 2021-05-31 VITALS — WEIGHT: 73.8 LBS | HEIGHT: 58 IN | BODY MASS INDEX: 15.49 KG/M2

## 2021-05-31 VITALS — WEIGHT: 74.4 LBS | HEIGHT: 58 IN | BODY MASS INDEX: 15.62 KG/M2

## 2021-05-31 NOTE — TELEPHONE ENCOUNTER
Patient Returning Call  Reason for call:  Elena   Information relayed to patient:  n/a  Patient has additional questions:  Yes  If YES, what are your questions/concerns:  Elena states they never received the order form for ensure. Please re fax scanned form of 7/16/2019 to new fax number [phone] today.  Okay to leave a detailed message?: Yes

## 2021-05-31 NOTE — TELEPHONE ENCOUNTER
Checked with Leslye who believes form came already and or PCP completed.  Will check with PCP later today or tomorrow. Thanks.

## 2021-05-31 NOTE — PROGRESS NOTES
[facility] Care Coordination Contact      [facility] Six-Month Telephone Assessment    6 month telephone assessment completed on 08/30/19.    ER visits: No  Hospitalizations: No  TCU stays: No  Significant health status changes: Reports no changes in her health  Falls/Injuries: No  ADL/IADL changes: No  Changes in services: No    Caregiver Assessment follow up:  Declines    Goals: See POC in chart for goal progress documentation.      Will see member in 6 months for an annual health risk assessment.   Encouraged member to call CC with any questions or concerns in the meantime.     Antionette SALOMON  [facility]  [phone]

## 2021-06-01 VITALS — WEIGHT: 75.7 LBS | HEIGHT: 58 IN | BODY MASS INDEX: 15.89 KG/M2

## 2021-06-01 VITALS — WEIGHT: 76 LBS | BODY MASS INDEX: 15.95 KG/M2 | HEIGHT: 58 IN

## 2021-06-01 VITALS — BODY MASS INDEX: 16.38 KG/M2 | HEIGHT: 57 IN | WEIGHT: 75.9 LBS

## 2021-06-01 NOTE — PROGRESS NOTES
"TCM DISCHARGE FOLLOW UP CALL    Discharge Date:  9/30/2019  Reason for hospital stay (discharge diagnosis)::  ARF  Are you feeling better, the same or worse since your discharge?:  Patient is feeling the same (\"She is sick.\" Pt is weak, she eats a little. She is aware of family and talks with them. She is making a small amount of urine.denies N/V or pruritis)  Do you feel like you have a plan in the event of a health emergency?: Yes (Son, 911)    As part of your discharge plan, were  home care services ordered for you?: No (Offered SNV but son declined.)    Did you receive any new medications, or was there a change to your medications?: Yes    Are you taking those medications, or do you have any established regiment?:  Son can read some English. He is giving carvedilol two times a day and Levaquin every other day and sodium bicarb 2 tablet two times a day   Do you have any follow up visits scheduled with your PCP or Specialist?:  No  I'm glad to hear you're doing well and we want you to continue to do well. Your PCP would like to see you for a follow-up visit. Can we help set that up for your today?: No (son states pt is too weak to come to the clinic)    (RN) Provided patient the PCP's phone number to call if they have any questions or concerns?: Yes      "

## 2021-06-01 NOTE — TELEPHONE ENCOUNTER
Refill Approved    Rx renewed per Medication Renewal Policy. Medication was last renewed on 9/4/18.    Nicolette Beatty, [facility] Triage/Med Refill 9/18/2019     Requested Prescriptions   Pending Prescriptions Disp Refills     atenolol (TENORMIN) 100 MG tablet [Pharmacy Med Name: ATENOLOL 100 MG TABLET] 30 tablet 0     Sig: TAKE ONE TABLET BY MOUTH ONCE DAILY       Beta-Blockers Refill Protocol Passed - 9/18/2019  9:31 AM        Passed - PCP or prescribing provider visit in past 12 months or next 3 months     Last office visit with prescriber/PCP: 6/4/2019 Mago Sherman MD OR same dept: 6/4/2019 Mago Sherman MD OR same specialty: 6/4/2019 Mago Sherman MD  Last physical: Visit date not found Last MTM visit: Visit date not found   Next visit within 3 mo: Visit date not found  Next physical within 3 mo: Visit date not found  Prescriber OR PCP: Mago Sherman MD  Last diagnosis associated with med order: There are no diagnoses linked to this encounter.  If protocol passes may refill for 12 months if within 3 months of last provider visit (or a total of 15 months).             Passed - Blood pressure filed in past 12 months     BP Readings from Last 1 Encounters:   06/04/19 120/80

## 2021-06-01 NOTE — TELEPHONE ENCOUNTER
RN cannot approve Refill Request    RN can NOT refill this medication med is not covered by policy/route to provider and medication not on med list. Last office visit: 6/4/2019 Mago Sherman MD Last Physical: Visit date not found Last MTM visit: Visit date not found Last visit same specialty: 6/4/2019 Mago Sherman MD.  Next visit within 3 mo: Visit date not found  Next physical within 3 mo: Visit date not found      Amalia Caceres, [facility] Triage/Med Refill 9/9/2019    Requested Prescriptions   Pending Prescriptions Disp Refills     RENAL CAPS 1 mg cap capsule [Pharmacy Med Name: RENAL CAPS SOFTGEL] 30 capsule 0     Sig: TAKE ONE CAPSULE BY MOUTH DAILY.       There is no refill protocol information for this order

## 2021-06-01 NOTE — PROGRESS NOTES
TRANSITIONS OF CARE (CLAUDIA) LOG   CLAUDIA tasks should be completed by the CC within one (1) business day of notification of each transition. Follow up contact with member is required after return to their usual care setting.  Note:  If CC finds out about the transitions fifteen (15) days or more after the member has returned to their usual care setting, no CLAUDIA log is needed. However, the CC should check in with the member to discuss the transition process, any changes needed to the care plan and document it in a case note.    Member Name:  Gilda Ireland MCO Name:  [employer] Member ID#:    Product: [employer] Care Coordinator Contact:  [facility] Agency/County/Care System: [place]   Transition Communication Actions from Care Management Contact   Transition #1   Notification Date: 09/30/19 Transition Date:   09/28/19 Transition From: Home     Is this the member s usual care setting?               yes Transition To: [facility]   Transition Type:  Unplanned  Reason for Admission/Comments:  Renal failure     Shared CC contact info, care plan/services with receiving setting--Date completed: 10/01/19    Notified PCP of transition--Date completed:  09/28/19     via  EMR   Transition #2   Transition #3  (if applicable)   Notification Date: 10/01/19         Transition To:  Home  Transition Date: 09/30/19     Transition Type:    Planned  Notified PCP -- Date completed: 09/30/19              Shared CC contact info, care plan/services with receiving setting or, if applicable, home care agency--Date completed:  10/01/19  *Complete additional tasks below, if this transition is a return to usual care setting.      Comments:  Member was d/c home with hospice referral. Care coordinator spoke with son and they have decided to decline to hospice/homecare services.     Notification Date:          Transition To:    Transition Date:              Transition Type:      Notified PCP--Date completed:          Shared CC  contact info, care plan/services with receiving setting or, if applicable, home care agency--Date completed:       *Complete additional tasks below, if this transition is a return to usual care setting.      Comments:       *Complete tasks below when the member is discharging TO their usual care setting within one (1) business day of notification.  For situations where the Care Coordinator is notified of the discharge prior to the date of discharge, the Care Coordinator must follow up with the member or designated representative to confirm that discharge actually occurred and discuss required CLAUDIA tasks as outlined in the CLAUDIA Instructions.  (This includes situations where it may be a  new  usual care setting for the member. (i.e., a community member who decides upon permanent nursing home placement following hospitalization and rehab).    Date completed: 10/01/19  Communicated with member or their designated representative about the following:  care transition process; about changes to the member s health status; plan of care updates; education about transitions and how to prevent unplanned transitions/readmissions  Four Pillars for Optimal Transition:    Check  Yes  - if the member, family member and/or SNF/facility staff manages the following:    If  No  provide explanation in the comments section.          [x]  Yes     []  No     Does the member have a follow-up appointment scheduled with primary care or specialist? (Mental health hospitalizations--the appt. should be w/in 7 days)   [x]  Yes     []  No     Can the member manage their medications or is there a system in place to manage medications (e.g. home care set-up)?         [x]  Yes     []  No     Can the member verbalize warning signs and symptoms to watch for and how to respond?         [x]  Yes     []  No     Does the member use a Personal Health Care Record?  Check  Yes  if visit summary, discharge summary, and/or healthcare summary are being used as a  PHR.                                                                                                                                                                                    [x] Yes      [] No      Have you updated the member s care plan?  If  No  provide explanation in comments.   Comments:      Antionette Cerda [facility]  [phone]

## 2021-06-02 VITALS — HEIGHT: 58 IN | WEIGHT: 79.4 LBS | BODY MASS INDEX: 16.67 KG/M2

## 2021-06-02 VITALS — HEIGHT: 58 IN | WEIGHT: 78.8 LBS | BODY MASS INDEX: 16.54 KG/M2

## 2021-06-02 VITALS — BODY MASS INDEX: 16.84 KG/M2 | HEIGHT: 58 IN | WEIGHT: 80.25 LBS

## 2021-06-02 VITALS — HEIGHT: 58 IN | WEIGHT: 77.4 LBS | BODY MASS INDEX: 16.25 KG/M2

## 2021-06-02 NOTE — TELEPHONE ENCOUNTER
RN cannot approve Refill Request    RN can NOT refill this medication med is not covered by policy/route to provider. Last office visit: 6/4/2019 Mago Sherman MD Last Physical: Visit date not found Last MTM visit: Visit date not found Last visit same specialty: 6/4/2019 Mago Sherman MD.  Next visit within 3 mo: Visit date not found  Next physical within 3 mo: Visit date not found      Fatoumata Vasquez, [facility] Triage/Med Refill 10/29/2019    Requested Prescriptions   Pending Prescriptions Disp Refills     sodium bicarbonate 650 MG tablet [Pharmacy Med Name: SODIUM BICARB 10 GRAIN TABLET] 60 tablet 0     Sig: TAKE TWO TABLETS BY MOUTH 2 TIMES A DAY       There is no refill protocol information for this order

## 2021-06-02 NOTE — TELEPHONE ENCOUNTER
Who is calling:  Nicolette with [facility], [phone]  Reason for Call:    Nicolette states patient's son declined home care for patient until she has her appointment with Provider on 10/29/19.  Nicolette is requesting Provider explain home care to patient's son.  He did not seem to understand what Nicolette was telling him about home care.  Please let Nicolette know if son decides to accept home care for patient and then call in new orders to above number.  Thank you.  Date of last appointment with primary care: 6/4/19  Okay to leave a detailed message: Yes

## 2021-06-02 NOTE — TELEPHONE ENCOUNTER
Pt has hospital f/u on Tuesday with PCP.  Does PCP wish to speak to son at that time or have son called on Monday.

## 2021-06-02 NOTE — PROGRESS NOTES
"HPI - 84 yo female here for hospital f/u and discuss home health vs hospice.   Today the appt was with her, her son and granddaughter and the [facility] RN and .     Admit 9/28-9/30/19 for acute on chronic renal failure  D/C summary and labs reviewed  tx'd for UTI   Significant progressive CKD with CR 11.7  Patient declined dialysis  Patient and family offered hospice which they understood as \"the doctor giving a med that makes her die\" and adamantly refused.   She was too weak to come initially for hospital f/u appt. She worked with the [facility] RN about home and community resources and they agreed to home health. However, she has not had a face to face apt within 90 days, so needed an appt to discuss.     Today she is feeling better, sleeping better, getting a little stronger but still weak and wheelchair bound. She drinks 1-2 Ensure shakes a day. Using porch plastic chair in the shower.     Reviewed needed resources:  Already has wheelchair and bedside commode   Ensure shakes are now gettihng covered by insurance for 60 cans/mo    Incontinence supplies- [employer]   Pull up diapers small - 150/mo  Gloves med gloves - box /mo  Shower chair  No chux    NEEDS:  Home health RN/PT/OT    SW help to discuss advanced care directive/ POLST and medical decision making that son wants to have family decide together    Citizenship - oath ceremony 11/26/19     meds refilled today    Current resources  [facility] enrolled  PCA - son and granddaughter   once yearly  PCA Agency visits [id number]     Current Outpatient Medications   Medication Sig     acetaminophen (TYLENOL) 500 MG tablet TAKE 1 TABLET (500MG TOTAL) BY MOUTH EVERY SIX HOURS AS NEEDED FOR PAIN.     calcitriol (ROCALTROL) 0.25 MCG capsule TAKE ONE CAPSULE BY MOUTH ON MONDAYS AND THURSDAYS     carvedilol (COREG) 3.125 MG tablet Take 1 tablet (3.125 mg total) by mouth 2 (two) times a day.     cholecalciferol, vitamin D3, 400 unit Tab Take 2,000 Units by mouth " "daily. Nephrologist Rx. 4/06/2017     gabapentin (NEURONTIN) 100 MG capsule Take 100 mg by mouth at bedtime.     RENAL CAPS 1 mg cap capsule TAKE ONE CAPSULE BY MOUTH DAILY.     simethicone (GAS RELIEF) 80 MG chewable tablet CHEW 1 TABLET BY MOUTH 4 TIMES A DAY AS NEEDED FOR FLATULENCE     sodium bicarbonate 650 MG tablet Take 2 tablets (1,300 mg total) by mouth 2 (two) times a day. OTC product     Vitals:    10/29/19 1445   BP: 110/56   Pulse: 77   Resp: 12   Temp: 97.8  F (36.6  C)   TempSrc: Oral   SpO2: 95%   Weight: (!) 72 lb 11.2 oz (33 kg)   Height: 4' 11.84\" (1.52 m)     OBJECTIVE:  Vitals listed above within normal limits  General appearance: well groomed, pleasant, well hydrated, nontoxic appearing  ENT: PERRL, throat clear  Neck: neck supple, no lymphadenopathy, no thyromegaly  Lungs: lungs clear to auscultation bilaterally, no wheezes or rhonchi  Heart: regular rate and rhythm, no murmurs, rubs or gallops  Abdomen: soft, nontender  Neuro: no focal deficits, CN II-XII grossly intact, alert and oriented  Psych:  mood stable, appears to have good insight and judgment    A/P  Hospital f/u for ESRD stage 5 w/o dialysis  Reviewed d/c summary labs, telephone encounters  Ordered home health RN/PT/OT  DME - shower chair, pull up diapers and gloves for caregivers  Discussed the resources and current needs and plan for 30 minutes    Advance care directive - attempted discussion for over 40 minutes about likely progressive decline with stage 5 CKD and attempted to fill out the POLST. Patient would like to \"let God decide and not do anything aggressive\" and was ready to sign the POLST. However, her son is concerned she will not be taken care of or that the doctor or RN or hospice team will give her a shot that will make her relaxed and then die. He wants to talk with the family   Will ask [facility] RN and SW to discuss with him and family the process of medical decision makers/surrogates and code status.     Spent 70 min " face to face with patient with more the 50% spent in counseling, reviewing chart with patient, and coordination of care, medication reconciliation and discussing problems listed above.

## 2021-06-02 NOTE — PROGRESS NOTES
Clinic Care Coordination Contact    Follow Up Progress Note      Assessment: f/u on home care status    Per chart review, home care hasn't started yet.   Writer spoke with [facility] home care intake, Oralia was told that home care referral was forward to [facility] care since 10/9/19.    Writer called [phone] and was told that they didn't get any referral for the patient.     [facility] home care will forward the referral to [facility] again today.     Writer will f/u with [facility] tomorrow.     Goals addressed this encounter:   Goals Addressed    None                    Plan:   1) [facility] RN will f/u with [facility] care tomorrow on status

## 2021-06-02 NOTE — PROGRESS NOTES
Clinic Care Coordination Contact    Follow Up Progress Note      Assessment: f/u on home care status with [facility] care    Spoke with Gissel and Oralia from  home care today.     Per Gissel, they need a new referral for home care because previous one was .     Writer placed a new referral today for home care     Gissel was able to confirm via [facility] that the new home care referral was placed today. She will contact patient/son and will call writer with any issue or concerns.     4:15pm - Gissle left writer a message stating that patient needs face to face with PCP because last face to face was >90 days.     Writer spoke with son and he's able to bring patient to see PCP on 10/29/19 @ 2:40pm.         Goals addressed this encounter:   Goals Addressed    None               Plan:   1) Patient will attend her appt with PCP on 10/29/19

## 2021-06-02 NOTE — TELEPHONE ENCOUNTER
RN cannot approve Refill Request    RN can NOT refill this medication med is not covered by policy/route to provider. Last office visit: 6/4/2019 Mago Sherman MD Last Physical: Visit date not found Last MTM visit: Visit date not found Last visit same specialty: 6/4/2019 Mago Sherman MD.  Next visit within 3 mo: Visit date not found  Next physical within 3 mo: Visit date not found      Amalia Caceres, [facility] Triage/Med Refill 10/8/2019    Requested Prescriptions   Pending Prescriptions Disp Refills     RENAL CAPS 1 mg cap capsule [Pharmacy Med Name: RENAL CAPS SOFTGEL] 30 capsule 0     Sig: TAKE ONE CAPSULE BY MOUTH DAILY.       There is no refill protocol information for this order

## 2021-06-03 VITALS
TEMPERATURE: 97.8 F | OXYGEN SATURATION: 95 % | SYSTOLIC BLOOD PRESSURE: 110 MMHG | RESPIRATION RATE: 12 BRPM | WEIGHT: 72.7 LBS | HEIGHT: 60 IN | BODY MASS INDEX: 14.27 KG/M2 | HEART RATE: 77 BPM | DIASTOLIC BLOOD PRESSURE: 56 MMHG

## 2021-06-03 NOTE — TELEPHONE ENCOUNTER
FYI - Status Update  Who is Calling: Bill Singh [facility] Investigator  Update:  Calling to update PCP that patient was found  today, 19 in her home. Caller is needing verbal consent from Dr Sherman that she will sign death certificate.  Okay to leave a detailed message?:  Yes

## 2021-06-03 NOTE — TELEPHONE ENCOUNTER
Orders being requested: Skilled nursing  2 week x 1   1 week x 6   3 PRN   Reason service is needed/diagnosis: Enstage Renal disease ,  Medication management    When are orders needed by: Today   Where to send Orders: Verbal to [employer] phone  Okay to leave detailed message?  Yes      Orders being requested:  PT an OT  Eval & treat  Reason service is needed/diagnosis:  Renal failure,  weak , fatigued , renal failure  When are orders needed by:  Today  Where to send Orders: Verbal to [employer] phone  Okay to leave detailed message?  Yes

## 2021-06-03 NOTE — PROGRESS NOTES
[facility] Care Coordination Contact    Notified by care coordinator that member passed away on 11/18/19 p/cc.    Truman Hodges  Care Management Specialist  [facility]  [phone]

## 2021-06-03 NOTE — TELEPHONE ENCOUNTER
Yes. I will sign death certificate.   She was a candidate for dialysis and  hospice but family declined, so not death is not unanticipated.     Dr. Mago Sherman  11/18/2019

## 2021-06-03 NOTE — PROGRESS NOTES
Situation(s):  CHW monthly outreach and follow up with patient.    Background:  Patient who has chronic health issues, language barrier and lack of community resources is enrolled with [facility] for coordination assistance and to get connected with community resources.    Assessment:  Patient completed all goals and currently has no new goal. Patient has PCA services and has family support for ongoing chronic health issues and patient is established with home care services.    Recommendation(s):  [facility] RN has reviewed patient's chart and advised to stay on active with CCC and goals created. Patient was advised to call the clinic CHW with questions and concerns regarding coordination assistance if additional resources needed.        Next outreach: 12/31/2019

## 2021-06-03 NOTE — PROGRESS NOTES
Writer received a call from patient's son this morning stating that patient passed away peacefully around 3:00am.     Family didn't know what to do so they called their . The  instructed them to call the clinic to notify PCP early in the morning.     Writer instructed son and daughter to call 911 to let know that patient passed away and writer will let PCP knows when she comes in.     Instructed son and grand daughter to call the clinic with concerns or questions.     Writer notified PCP in person the passing away of the patient.

## 2021-06-03 NOTE — TELEPHONE ENCOUNTER
Called [facility] RN to relay verbal approval for the following orders.    Orders being requested: Skilled nursing  2 week x 1   1 week x 6   3 PRN   Reason service is needed/diagnosis: Enstage Renal disease ,  Medication management       Orders being requested:  PT an OT  Eval & treat  Reason service is needed/diagnosis:  Renal failure,  weak , fatigued , renal failure.  Thanks.       MARIBEL for PCP. Thanks.

## 2021-06-09 NOTE — PROGRESS NOTES
My Clinic Care Coordination Maintenance Plan    [facility]  [address] [place]  [address]  [phone]      My Preferred Method of Contact:  Phone: [phone]    My Primary/Preferred Language:  Sanjuanita    Emergency Contact: No emergency contact information on file.     PCP:  Mago Sherman MD  Specialists:      Advanced Directive/Living Will: The patient was given information regarding Adanced Directives/Living Will      My Care Guide's Name and Phone Number:  Mariusz Ambrosio Phone [phone]   The Care Guide and myself agreed to be in contact every 6 months.    Goals       COMPLETED:  I would like for my medications to be setup and organized.  (pt-stated)            Action steps to achieve this goal  1.  Per PCA, patient is doing Ok at this time. Sometime patient complain about feeling dizzy and headache. PCA continue help remind and setup medications for his mom Gilda Ireland.   2.  Per PCa, patient continue take her medications as recommended by her doctor.   3.  Per PCA there is no questions and concern about patient medications at this time. PCA will contact care guide if he has any question regarding of patient health.     Update:7/8/2016  Date goal set:  8-          COMPLETED: I would like an appointment to see my Kidney specialist.  (pt-stated)            Action steps to achieve this goal  1.  Per PCA, patient has no appointment to follow up with [facility]phrology [facility] Associate. Daniel Menjivar (PCA/Son) stated that [facility] will follow up with Gilda Ireland if they need to see her.  2.  Per PCA, patient is doing ok. Sometime patient complain about her headache and body soreness. Gilda Ireland continue taking medication as prescribed by her doctor.     Update: 7/8/2016  Date goal set:  2/5/2015        COMPLETED: I would like better fitting bottom dentures for my mom, Gilda Ireland.  (pt-stated)            Action steps to achieve this goal    1. Per PCA, patient went back to community  dental care but the fitting dentures still not fitting well for patient and patient no longer want to use it.       Update: 4/8/2016  Date goal set:  5/4/2015    Son name: Kamari Menjivar        COMPLETED: PCA continue help coordinated patient appointments, medications and transporation with specialists and primary.  (pt-stated)            Action steps to achieve this goal  1.  Per PCA continue help patient coordinate her appointments with her PCP or specialty appointments as needed. PCA continue help patient  follow up with her kidney doctor once a month as scheduled. Patient next coming appt is on 3/30/17.  Patient met with her PCP on 2/21 at 11:00am.   2. Per PCA, patient has all her medications refilled. PCA continue help coordinate patient medications and help patient take her medications as prescribed.   3. There is no questions or concerns at this time .     Update: 3/28/2017  Date goal set:  8-             All [facility] clinic patients have access to a Nurse 24 hours a day, 7 days a week.  If you have questions or want advice from a Nurse, please know [facility] is here for you.  You can call your clinic and they will connect you or you can call [facility] at [phone].  [facility] also has Walk In Care clinics in multiple locations.  Call the number listed above for more information about our Walk In Care clinics or visit the [facility] website at [email or web address].

## 2021-06-09 NOTE — PROGRESS NOTES
"HPI - 84 yo female here for HTN.     HTN - BP is good today    N&V resolved    Vit D deficiency - taking ergo 98557 weekly    CKD stage 5  Co-managed with [facility]  Started EPO in Jan 2017 and getting monthly  Last apt 2/13/17 and next 3/30/17  Taking renal cap    Current Outpatient Prescriptions   Medication Sig     alendronate (FOSAMAX) 70 MG tablet Take 1 tablet (70 mg total) by mouth every 7 days. Take in the morning on an empty stomach with a full glass of water 30 minutes before food     ammonium lactate (LAC-HYDRIN) 12 % lotion APPLY TO DRY AREAS DAILY AS NEEDED     atenolol (TENORMIN) 50 MG tablet Take 1 tablet (50 mg total) by mouth daily.     food supplemt, lactose-reduced (ENSURE COMPLETE) 0.05-1.5 gram-kcal/mL Liqd Take 1 Can by mouth 2 (two) times a day. Have 1-2 daily or protein powder equivalent     gabapentin (NEURONTIN) 100 MG capsule 1-2 tabs at bedtime     MAGNESIUM HYDROXIDE 400 mg/5 mL suspension TAKE 30ML BY MOUTH DAILY AS NEEDED.     ondansetron (ZOFRAN, AS HYDROCHLORIDE,) 4 MG tablet Take 1 tablet (4 mg total) by mouth every 12 (twelve) hours as needed for nausea.     Q-PAP EXTRA STRENGTH 500 mg tablet TAKE 1 TABLET (500MG TOTAL) BY MOUTH EVERY SIX HOURS AS NEEDED FOR PAIN.     RENAL CAPS 1 mg cap capsule TAKE 1 CAPSULE DAILY     VITAMIN D2 50,000 unit capsule TAKE 1 CAPSULE ONCE A WEEK     Vitals:    03/23/17 1338   BP: 130/82   Pulse: 69   Resp: 16   Temp: 98.4  F (36.9  C)   TempSrc: Oral   SpO2: 95%   Weight: (!) 79 lb (35.8 kg)   Height: 4' 9.5\" (1.461 m)     PHYSICAL EXAM   General Appearance: Awake and alert, in no acute distress  HEENT: neck is supple  CV: regular rate  Resp: No respiratory distress. Breathing comfortably  Musculoskeletal: moving limbs comfortably with not deficits or deformities  Skin: no rashes noted    A/P  HTN - BP is good today  Continue current regimen    N&V resolved    Vit D deficiency - taking ergo 68082 weekly    CKD stage 5  Co-managed " [facility]  Started EPO in Jan 2017 and getting monthly  Last apt 2/13/17 and next 3/30/17  Taking renal cap    DATA REVIEWED:  Additional History from Old Records Summarized (2): reviewed renal records, hospital records  Decision to Obtain Records (1): 0  Radiology Tests Summarized or Ordered (1): 0  Labs Reviewed or Ordered (1): reviewed recently labs done at clinic and renal office  Medicine Test Summarized or Ordered (1): 0   Independent Review of EKG or X-RAY(2 each): 0    The visit lasted a total of 30 minutes face to face with the patient. Over 50% of the time was spent counseling and educating the patient about discussed BP, anemia and epo shots, and medication review.

## 2021-06-09 NOTE — PROGRESS NOTES
HPI - 82 yo female here with nausea.     She has nausea but no vomiting, extra saliva where she has to spit, poor appetite, subjective fever, and bloating.   She takes a bath to help with fever.     No Sore throat or pain with swallowing  No diarrhea but is constipated.  No cough or sob  Sx started Jan 13 and got better and then returned 3 days ago.   Flu shot 8/3016     CKD 4 - co-managed by renal   She had labs drawn last week at renal clinic.     REVIEW OF SYSTEMS  General: no weight changes, fatigue  HEENT:  no HA,  no vision changes, URI sx  Respiratory:  no cough, dyspnea  Cardiovascular: no chest pain, palpitations  Gastrointestinal: see HPI  : no dysuria, no vaginal d/c  Neurologic: no seizures, focal weakness, tremors  Skin: no rashes        Current Outpatient Prescriptions   Medication Sig     alendronate (FOSAMAX) 70 MG tablet Take 1 tablet (70 mg total) by mouth every 7 days. Take in the morning on an empty stomach with a full glass of water 30 minutes before food     ammonium lactate (LAC-HYDRIN) 12 % lotion APPLY TO DRY AREAS DAILY AS NEEDED     atenolol (TENORMIN) 25 MG tablet Take 1 tablet (25 mg total) by mouth daily.     food supplemt, lactose-reduced (ENSURE COMPLETE) 0.05-1.5 gram-kcal/mL Liqd Take 1 Can by mouth 2 (two) times a day. Have 1-2 daily or protein powder equivalent     lidocaine (XYLOCAINE) 5 % ointment Apply to affected area twice daily as needed for muscle pain     MAGNESIUM HYDROXIDE 400 mg/5 mL suspension TAKE 30ML BY MOUTH DAILY AS NEEDED.     Q-PAP EXTRA STRENGTH 500 mg tablet TAKE 1 TABLET (500MG TOTAL) BY MOUTH EVERY SIX HOURS AS NEEDED FOR PAIN.     RENAL CAPS 1 mg cap capsule TAKE 1 CAPSULE DAILY     VITAMIN D2 50,000 unit capsule TAKE 1 CAPSULE ONCE A WEEK     gabapentin (NEURONTIN) 100 MG capsule 1-2 tabs at bedtime     peg 400-propylene glycol (SYSTANE, PROPYLENE GLYCOL,) 0.4-0.3 % Drop 1 drop to both eyes daily as needed     Vitals:    02/21/17 1109   BP: 102/58  "  Pulse: 78   Resp: 16   Temp: 98.2  F (36.8  C)   TempSrc: Oral   SpO2: 94%   Weight: (!) 78 lb 4.8 oz (35.5 kg)   Height: 4' 9.8\" (1.468 m)     OBJECTIVE:  Vitals listed above within normal limits  General appearance: well groomed, pleasant, well hydrated, nontoxic appearing, mildly dehydrated  ENT: PERRL, throat clear  Neck: neck supple, no lymphadenopathy, no thyromegaly  Lungs: lungs clear to auscultation bilaterally, no wheezes or rhonchi  Heart: regular rate and rhythm, no murmurs, rubs or gallops  Abdomen: soft, nontender  Neuro: no focal deficits, CN II-XII grossly intact, alert and oriented  Psych:  mood stable, appears to have good insight and judgment    BMP done by renal  CR 3.18  K+ and Na+ wnl    Recent Results (from the past 1008 hour(s))   Influenza A/B Rapid Test    Collection Time: 02/21/17 12:01 PM   Result Value Ref Range    Influenza  A, Rapid Antigen No Influenza A antigen detected No Influenza A antigen detected    Influenza B, Rapid Antigen No Influenza B antigen detected No Influenza B antigen detected       A/P  Gastroenteritis vs viral infection -  with nausea, fever, bloating  rx for zofran  Advised hydration with sips of water, juice, sprite hourly to avoid dehydration  RTC in 2 days for close f/u   Advised to go to ER if unable to keep fluids down and/or sx worsen    Spent 25 min face to face with patient with more the 50% spent in counseling, reviewing chart, and coordination of care and discussing problems listed above.       "

## 2021-06-09 NOTE — PROGRESS NOTES
"HPI - 84 yo female here to f/u on N&V and mild dehydration.     N&V and bloating are better today.   zofran helped.   Eating and drinking better.     HTN - elevated BP on several occasions  On atenolol 25mg     Current Outpatient Prescriptions   Medication Sig     alendronate (FOSAMAX) 70 MG tablet Take 1 tablet (70 mg total) by mouth every 7 days. Take in the morning on an empty stomach with a full glass of water 30 minutes before food     ammonium lactate (LAC-HYDRIN) 12 % lotion APPLY TO DRY AREAS DAILY AS NEEDED     atenolol (TENORMIN) 25 MG tablet Take 1 tablet (25 mg total) by mouth daily.     food supplemt, lactose-reduced (ENSURE COMPLETE) 0.05-1.5 gram-kcal/mL Liqd Take 1 Can by mouth 2 (two) times a day. Have 1-2 daily or protein powder equivalent     gabapentin (NEURONTIN) 100 MG capsule 1-2 tabs at bedtime     lidocaine (XYLOCAINE) 5 % ointment Apply to affected area twice daily as needed for muscle pain     MAGNESIUM HYDROXIDE 400 mg/5 mL suspension TAKE 30ML BY MOUTH DAILY AS NEEDED.     ondansetron (ZOFRAN, AS HYDROCHLORIDE,) 4 MG tablet Take 1 tablet (4 mg total) by mouth every 12 (twelve) hours as needed for nausea.     Q-PAP EXTRA STRENGTH 500 mg tablet TAKE 1 TABLET (500MG TOTAL) BY MOUTH EVERY SIX HOURS AS NEEDED FOR PAIN.     RENAL CAPS 1 mg cap capsule TAKE 1 CAPSULE DAILY     VITAMIN D2 50,000 unit capsule TAKE 1 CAPSULE ONCE A WEEK     Vitals:    02/23/17 1320 02/23/17 1328   BP: 160/60 142/60   Pulse: 77    Resp: 18    Temp: 98.2  F (36.8  C)    SpO2: 98%    Weight: (!) 79 lb 8 oz (36.1 kg)    Height: 4' 9.8\" (1.468 m)      OBJECTIVE:  Vitals listed above within normal limits  General appearance: well groomed, pleasant, well hydrated, nontoxic appearing  ENT: PERRL, throat clear  Neck: neck supple, no lymphadenopathy, no thyromegaly  Lungs: lungs clear to auscultation bilaterally, no wheezes or rhonchi  Heart: regular rate and rhythm, no murmurs, rubs or gallops  Abdomen: soft, " nontender  Neuro: no focal deficits, CN II-XII grossly intact, alert and oriented  Psych:  mood stable, appears to have good insight and judgment    A/P  N&V and bloating are better today.   zofran helped.   Eating and drinking better.       HTN - elevated BP on several occasions  increase atenolol from 25mg to 50mg daily  RTC for BP check in 4 weeks

## 2021-06-11 NOTE — PROGRESS NOTES
HPI - 84 yo female here to f/u on HTN and CKD.     HTN  Atenolol 50m    Vit D deficiency - taking ergo 64326 weekly     CKD stage 5  [facility]  Started EPO in Jan 2017 and getting monthly for anemia  Last apt 2/13/17 and 3/30/17 - labs drawn and advised RTC 6 weeks  Taking renal cap    Osteoporosis   On alendronate  DEXA 10/2016 -   Assessment:  1. The spine bone density L1-L4 with T-score -4.7.  2. Femoral bone densities show left femoral neck T- score -3.6 and right femoral neck T-score -3.8.  3. Trabecular bone score indicates poor trabecular bone architecture.  82 y.o. female with OSTEOPOROSIS and HIGH fracture risk.  Recommendations:  Appropriate evaluation and treatment recommended with follow up bone density scan in 1 year.    C/o gassiness  No abdo pain, no appetite concern better than before  H/o Hpylori an GERD    Constipation - improved with milk of mag    Joint pain -   Tylenol, gabapentin and lidogel        Current Outpatient Prescriptions   Medication Sig     acetaminophen (Q-PAP EXTRA STRENGTH) 500 MG tablet TAKE 1 TABLET (500MG TOTAL) BY MOUTH EVERY SIX HOURS AS NEEDED FOR PAIN.     alendronate (FOSAMAX) 70 MG tablet Take 1 tablet (70 mg total) by mouth every 7 days. Take in the morning on an empty stomach with a full glass of water 30 minutes before food     ammonium lactate (LAC-HYDRIN) 12 % lotion APPLY TO DRY AREAS DAILY AS NEEDED     atenolol (TENORMIN) 50 MG tablet Take 1 tablet (50 mg total) by mouth daily.     cholecalciferol, vitamin D3, 400 unit Tab Take 2,000 Units by mouth daily. Nephrologist Rx. 4/06/2017     gabapentin (NEURONTIN) 100 MG capsule TAKE 1 CAPSULE BY MOUTH AT BEDTIME.     lidocaine (XYLOCAINE) 5 % ointment APPLY TO AFFECTED AREAS TWICE DAILY AS NEEDED FOR MUSCLE PAIN     MAGNESIUM HYDROXIDE 400 mg/5 mL suspension TAKE 30ML BY MOUTH DAILY AS NEEDED.     B complex with C#20-folic acid (RENAL CAPS) 1 mg cap capsule TAKE 1 CAPSULE DAILY      "food supplemt, lactose-reduced (ENSURE COMPLETE) 0.05-1.5 gram-kcal/mL Liqd Take 1 Can by mouth 2 (two) times a day. Have 1-2 daily or protein powder equivalent     Vitals:    07/03/17 1032 07/03/17 1050 07/03/17 1106   BP: 168/62 162/60 168/70   Patient Site: Right Arm Left Arm    Patient Position: Sitting Sitting    Cuff Size: Child Child    Pulse: 70 68    Resp: 24     Temp: 98.1  F (36.7  C)     TempSrc: Oral     SpO2: 98%     Weight: (!) 75 lb (34 kg)     Height: 4' 10\" (1.473 m)         PHYSICAL EXAM   General Appearance: Awake and alert, in no acute distress  HEENT: neck is supple  CV: regular rate  Resp: No respiratory distress. Breathing comfortably  Musculoskeletal: moving limbs comfortably with not deficits or deformities  Skin: no rashes noted    A/P  HTN - BP elevated today  Atenolol 50m -> increase to 100mg    Vit D deficiency - taking ergo 90777 weekly     CKD stage 5  [facility]  Started EPO in Jan 2017 and getting monthly for anemia  Last apt 2/13/17 and 3/30/17 - labs drawn and advised RTC 6 weeks  Taking renal cap    Osteoporosis   On alendronate    C/o gassiness  Check H pylori and rx simethicone    Constipation - improved with milk of mag    Joint pain -   Tylenol, gabapentin and lidogel      Spent 25 min face to face with patient with more the 50% spent in counseling, reviewing chart, and coordination of care and discussing problems listed above.     "

## 2021-06-12 NOTE — PROGRESS NOTES
HPI - 82 yo female here for f/u on HTN .     HTN - BP elevated today  Last visit 7/3/17 increased to 100mg     Vit D deficiency - taking ergo 35822 weekly      CKD stage 5  [facility]  Taking renal cap  Started EPO in Jan 2017 and getting monthly for anemia but did not need one in August  Last apt 8/22/17 - CR 3.6, GFR 11  F/u on Oct 3    Osteoporosis with DEXA showing high risk for fracture  On alendronate     C/o gassiness  H pylori neg on recheck   rx simethicone     Constipation - improved with milk of mag     Joint pain -   Tylenol, gabapentin and lidogel    Malnutrition and unexplained weightloss  Eating ok with protein and having ensure shake BID  86# 11/2016  78# 2/2017  75# 7/3/17  74# 9/11/17    Current Outpatient Prescriptions   Medication Sig     acetaminophen (Q-PAP EXTRA STRENGTH) 500 MG tablet TAKE 1 TABLET (500MG TOTAL) BY MOUTH EVERY SIX HOURS AS NEEDED FOR PAIN.     alendronate (FOSAMAX) 70 MG tablet Take 1 tablet (70 mg total) by mouth every 7 days. Take in the morning on an empty stomach with a full glass of water 30 minutes before food     ammonium lactate (LAC-HYDRIN) 12 % lotion APPLY TO DRY AREAS DAILY AS NEEDED     atenolol (TENORMIN) 100 MG tablet Take 1 tablet (100 mg total) by mouth daily.     B complex with C#20-folic acid (RENAL CAPS) 1 mg cap capsule TAKE 1 CAPSULE DAILY     cholecalciferol, vitamin D3, 400 unit Tab Take 2,000 Units by mouth daily. Nephrologist Rx. 4/06/2017     gabapentin (NEURONTIN) 100 MG capsule TAKE 1 CAPSULE BY MOUTH AT BEDTIME.     lidocaine (XYLOCAINE) 5 % ointment APPLY TO AFFECTED AREAS TWICE DAILY AS NEEDED FOR MUSCLE PAIN     MAGNESIUM HYDROXIDE 400 mg/5 mL suspension TAKE 30ML BY MOUTH DAILY AS NEEDED.     simethicone (GAS-X) 80 MG chewable tablet Chew 1 tablet (80 mg total) 4 (four) times a day as needed for flatulence.     food supplemt, lactose-reduced (ENSURE COMPLETE) 0.05-1.5 gram-kcal/mL Liqd Take 1 Can by mouth 2  "(two) times a day. Have 1-2 daily or protein powder equivalent     Vitals:    09/11/17 1000   BP: 140/68   Pulse: 70   Resp: 12   Temp: 97.6  F (36.4  C)   TempSrc: Oral   SpO2: 99%   Weight: (!) 74 lb 4.8 oz (33.7 kg)   Height: 4' 10\" (1.473 m)     PHYSICAL EXAM   General Appearance: Awake and alert, in no acute distress  HEENT: neck is supple  CV: regular rate  Resp: No respiratory distress. Breathing comfortably  Musculoskeletal: moving limbs comfortably with not deficits or deformities  Skin: no rashes noted    A/P  HTN - BP elevated today but did not med today yet  Last visit 7/3/17 increased to 100mg     Vit D deficiency - taking ergo 43965 weekly      CKD stage 5  [facility]  Taking renal cap  Started EPO in Jan 2017 and getting monthly for anemia but did not need one in August  Last apt 8/22/17 - CR 3.6, GFR 11  F/u on Oct 3    Osteoporosis with DEXA showing high risk for fracture  On alendronate     C/o gassiness  H pylori neg on recheck   rx simethicone     Constipation - improved with milk of mag     Joint pain -   Tylenol, gabapentin and lidogel    Malnutrition and unexplained weightloss  Eating ok with protein and having ensure shake BID  Unclear etiology - DDX: CKD, cancer, poor absorption  Continue to monitor  Given her age and comorbidities, will not be too aggressive  Encouraged to eat any foods she wants except caution with salt intake    Itchy dry eye  rx artificial tears    Flu shot given    Spent 25 min face to face with patient with more the 50% spent in counseling, reviewing chart, and coordination of care and discussing problems listed above.     "

## 2021-06-13 NOTE — PROGRESS NOTES
Per patient son, patient is doing ok.  Patient son continue help patient take her medications and attended her specialist appointment successfully. Patient has follow up appt with [facility]acon doctor on 10/3/17 and PCP on 12/11/17. Per patient son, family member  encourage patient to eat healthy, to drink plenty of fluids and moving around as much as she could. There is no major questions or concerns at this time. Patient son agrees to contact the clinic for help when there is a concerns.

## 2021-06-13 NOTE — PROGRESS NOTES
Updated emergency care plan for maintenance/ graduation plan.     Chronic Kidney Disease (CKD)  Chronic kidney disease can result from many causes including infections, diabetes, high blood pressure, kidney stones, circulation problems, and reactions to medication. Having kidney disease means making many changes in your life. Learn as much as you can about it so that you can better adjust to these changes. It is important to remember that the main goal of treatment is to stop chronic kidney disease (CKD) from progressing to complete kidney failure. Treatments may vary based on the progression of CKD, so always follow your doctor's instructions in the care and management of your condition.  When to seek medical care  Call your doctor right away if you have any of the following:    Trouble eating or drinking    Weight loss of more than 2 pounds in 24 hours or more than 5 pounds in 7 days    Little or no urine output    Trouble breathing    Muscle aches    Fever of 100.4 F or higher, or chills    Blood in your urine or stool    Bloody discharge from your nose, mouth, or ears    Severe headache or a seizure    Vomiting    Swelling of legs or ankles    Chest pain (call 911)    High Blood Pressure (Hypertension)  You have been diagnosed with high blood pressure (also called hypertension). This means the force of blood against your artery walls is too strong. It also means your heart is working hard to move blood. High blood pressure usually has no symptoms, but over time, it can damage your heart, blood vessels, eyes, kidneys, and other organs. With help from your doctor, you can manage your blood pressure and protect your health.  Taking medications    Learn to take your own blood pressure. Keep a record of your results. Ask your doctor which readings mean that you need medical attention.    Take your blood pressure medication exactly as directed. Don t skip doses. Missing doses can cause your blood pressure to get out  of control.    Avoid medications that contain heart stimulants, including over-the-counter drugs. Check for warnings about high blood pressure on the label.    Check with your doctor before taking a decongestant. Some decongestants can worsen high blood pressure.  Lifestyle changes    Maintain a healthy weight. Get help to lose any extra pounds.    Cut back on salt.  o Limit canned, dried, packaged, and fast foods.  o Don t add salt to your food at the table.  o Season foods with herbs instead of salt when you cook.    Follow the DASH (Dietary Approaches to Stop Hypertension) eating plan. This plan recommends vegetables, fruits, whole gains, and other heart healthy foods.    Begin an exercise program. Ask your doctor how to get started. The American Heart Association recommends aerobic exercise 3 to 4 times a week for an average of 40 minutes at a time, with your doctor's approval. Simple activities like walking or gardening can help.    Break the smoking habit. Enroll in a stop-smoking program to improve your chances of success. Ask your health care provider about programs and medications to help you stop smoking.    Limit drinks that contain caffeine (coffee, black or green tea, cola) to 2 per day.    Never take stimulants such as amphetamines or cocaine; these drugs can be deadly for someone with high blood pressure.    Control your stress. Learn stress-management techniques.    Limit alcohol to no more than 1 drink a day for women and 2 drinks a day for men.  Follow-up care  Make a follow-up appointment as directed by our staff.     When to seek medical care  Call your doctor immediately if you have any of the following:    Chest pain or shortness of breath (call 911)    Moderate to severe headache    Weakness in the muscles of your face, arms, or legs    Trouble speaking    Extreme drowsiness    Confusion    Fainting or dizziness    Pulsating or rushing sound in your ears    Unexplained nosebleed    Weakness,  tingling, or numbness of your face, arms, or legs    Change in vision    Blood pressure measured at home that is greater than 180/110

## 2021-06-13 NOTE — PROGRESS NOTES
My Clinic Care Coordination Wellness Plan  This Graduation Wellness Plan provides private information in regards to the work I have done with my Care Team from my Primary Care Clinic.  This document provides insight on the goals I have accomplished.  My Care Team congratulates me on my journey to maintain wellness.  This document will help guide me on my journey to maintain a healthy lifestyle.  I will use this to help me overcome any barriers I may encounter.  If I should have any questions or concerns, I will continue to contact the members of my Care Team or contact my Primary Care Clinic for assistance.      [facility]  [address] [place]  [address]  [phone]        My Preferred Method of Contact:  Phone: [phone]    My Primary/Preferred Language:  Sanjuanita    Preferred Learning Style:  Face to face discussion, Pictures/Diagrams and Hands on teaching    Emergency Contact: Extended Emergency Contact Information  Primary Emergency Contact: Marcus Tom   [place]  Mobile Phone: [phone]  Relation: Child     PCP:  Mago Sherman MD  Specialists:      Accomplishments:  Goals       COMPLETED:  I would like for my medications to be setup and organized.  (pt-stated)            Action steps to achieve this goal  1.  Per PCA, patient is doing Ok at this time. Sometime patient complain about feeling dizzy and headache. PCA continue help remind and setup medications for his mom Gilda Ireland.   2.  Per PCa, patient continue take her medications as recommended by her doctor.   3.  Per PCA there is no questions and concern about patient medications at this time. PCA will contact care guide if he has any question regarding of patient health.     Update:7/8/2016  Date goal set:  8-          COMPLETED: I would like an appointment to see my Kidney specialist.  (pt-stated)            Action steps to achieve this goal  1.  Per PCA, patient has no appointment to follow up  with [facility]. Daniel Menjivar (PCA/Son) stated that [facility] will follow up with Gilda Ireland if they need to see her.  2.  Per PCA, patient is doing ok. Sometime patient complain about her headache and body soreness. Gilda Ireland continue taking medication as prescribed by her doctor.     Update: 7/8/2016  Date goal set:  2/5/2015        COMPLETED: I would like better fitting bottom dentures for my mom, Gilda Ireland.  (pt-stated)            Action steps to achieve this goal    1. Per PCA, patient went back to [facility] dental care but the fitting dentures still not fitting well for patient and patient no longer want to use it.       Update: 4/8/2016  Date goal set:  5/4/2015    Son name: Kamari Menjivar        COMPLETED: PCA continue help coordinated patient appointments, medications and transporation with specialists and primary.  (pt-stated)            Action steps to achieve this goal  1.  Per PCA continue help patient coordinate her appointments with her PCP or specialty appointments as needed. PCA continue help patient  follow up with her kidney doctor once a month as scheduled. Patient next coming appt is on 3/30/17.  Patient met with her PCP on 2/21 at 11:00am.   2. Per PCA, patient has all her medications refilled. PCA continue help coordinate patient medications and help patient take her medications as prescribed.   3. There is no questions or concerns at this time .     Update: 3/28/2017  Date goal set:  8-              Advanced Directive/Living Will: The patient was given information regarding Adanced Directives/Living Will    Clinical Emergency Plan  Updated emergency care plan for maintenance/ graduation plan.      Chronic Kidney Disease (CKD)  Chronic kidney disease can result from many causes including infections, diabetes, high blood pressure, kidney stones, circulation problems, and reactions to medication. Having kidney disease means making many changes in your life. Learn as much  as you can about it so that you can better adjust to these changes. It is important to remember that the main goal of treatment is to stop chronic kidney disease (CKD) from progressing to complete kidney failure. Treatments may vary based on the progression of CKD, so always follow your doctor's instructions in the care and management of your condition.  When to seek medical care  Call your doctor right away if you have any of the following:    Trouble eating or drinking    Weight loss of more than 2 pounds in 24 hours or more than 5 pounds in 7 days    Little or no urine output    Trouble breathing    Muscle aches    Fever of 100.4 F or higher, or chills    Blood in your urine or stool    Bloody discharge from your nose, mouth, or ears    Severe headache or a seizure    Vomiting    Swelling of legs or ankles    Chest pain (call 911)     High Blood Pressure (Hypertension)  You have been diagnosed with high blood pressure (also called hypertension). This means the force of blood against your artery walls is too strong. It also means your heart is working hard to move blood. High blood pressure usually has no symptoms, but over time, it can damage your heart, blood vessels, eyes, kidneys, and other organs. With help from your doctor, you can manage your blood pressure and protect your health.  Taking medications    Learn to take your own blood pressure. Keep a record of your results. Ask your doctor which readings mean that you need medical attention.    Take your blood pressure medication exactly as directed. Don t skip doses. Missing doses can cause your blood pressure to get out of control.    Avoid medications that contain heart stimulants, including over-the-counter drugs. Check for warnings about high blood pressure on the label.    Check with your doctor before taking a decongestant. Some decongestants can worsen high blood pressure.  Lifestyle changes    Maintain a healthy weight. Get help to lose any extra  pounds.    Cut back on salt.    Limit canned, dried, packaged, and fast foods.    Don t add salt to your food at the table.    Season foods with herbs instead of salt when you cook.    Follow the DASH (Dietary Approaches to Stop Hypertension) eating plan. This plan recommends vegetables, fruits, whole gains, and other heart healthy foods.    Begin an exercise program. Ask your doctor how to get started. The American Heart Association recommends aerobic exercise 3 to 4 times a week for an average of 40 minutes at a time, with your doctor's approval. Simple activities like walking or gardening can help.    Break the smoking habit. Enroll in a stop-smoking program to improve your chances of success. Ask your health care provider about programs and medications to help you stop smoking.    Limit drinks that contain caffeine (coffee, black or green tea, cola) to 2 per day.    Never take stimulants such as amphetamines or cocaine; these drugs can be deadly for someone with high blood pressure.    Control your stress. Learn stress-management techniques.    Limit alcohol to no more than 1 drink a day for women and 2 drinks a day for men.  Follow-up care  Make a follow-up appointment as directed by our staff.     When to seek medical care  Call your doctor immediately if you have any of the following:    Chest pain or shortness of breath (call 911)    Moderate to severe headache    Weakness in the muscles of your face, arms, or legs    Trouble speaking    Extreme drowsiness    Confusion    Fainting or dizziness    Pulsating or rushing sound in your ears    Unexplained nosebleed    Weakness, tingling, or numbness of your face, arms, or legs    Change in vision    Blood pressure measured at home that is greater than 180/110                                All [facility] clinic patients have access to a Nurse 24 hours a day, 7 days a week.  If you have questions or want advice from a Nurse, please know [facility] is here for you.   You can call your clinic and they will connect you or you can call [facility] at [phone].  [facility] also has Walk In Care clinics in multiple locations.  Call the number listed above for more information about our Walk In Care clinics or visit the [facility] website at [email or web address].

## 2021-06-14 NOTE — PROGRESS NOTES
HPI - 84 yo female here to f/u on HTN and CKD.     She has some good days and bad days.   She has cold/URI sx but not today.     HTN - BP is wnl on atenolol    CKD stage 5  Co-managed with [facility]  Taking renal cap  Started EPO in Jan 2017 and getting monthly for anemia but did not need one in August  Last apt 8/22/17 - CR 3.6, GFR 11  F/u on Oct 3 and 11/13/17   Per consult note:  Labs 11/13/17 CR 4.23/GFR 9, BUN 9  CKD stage 5 advised adequate hydration, if develops sx of uremia may consider hospice  Anemia - on epo  Acidosis - if CO2 persistently below 20, will start bicarb    Vit D deficiency - taking ergo 69800 weekly      Osteoporosis with DEXA  10/31/16 showing high risk for fracture  On alendronate  Advised repeat in 1 year      C/o gassiness  H pylori neg on recheck   rx simethicone is helping but needs refills      Constipation - improved with milk of mag      Joint pain -   Tylenol, gabapentin and lidogel     Malnutrition and unexplained weightloss  Eating ok with protein and having ensure shake BID  Unclear etiology - DDX: CKD, cancer, poor absorption  Given her age and comorbidities, will not be too aggressive       Itchy dry eye  rx artificial tears    Current Outpatient Prescriptions   Medication Sig     acetaminophen (MAPAP EXTRA STRENGTH) 500 MG tablet Take 1 tablet (500 mg total) by mouth every 6 (six) hours as needed for pain.     alendronate (FOSAMAX) 70 MG tablet TAKE 1 TABLET ORALLY EVERY 7 DAYS, TAKE IN THE MORNING ON AN EMPTY STOMACH WITH A FULL GLASS OF WATER 30 MINUTES BEFORE FOOD     ammonium lactate (LAC-HYDRIN) 12 % lotion APPLY TO DRY AREAS DAILY AS NEEDED     atenolol (TENORMIN) 100 MG tablet Take 1 tablet (100 mg total) by mouth daily.     B complex with C#20-folic acid (RENAL CAPS) 1 mg cap capsule TAKE 1 CAPSULE DAILY     camphor-menthol (SARNA) lotion Apply topically as needed. APPLY TWICE DAILY     cholecalciferol, vitamin D3, 400 unit Tab Take 2,000 Units by  "mouth daily. Nephrologist Rx. 4/06/2017     gabapentin (NEURONTIN) 100 MG capsule TAKE 1 CAPSULE BY MOUTH AT BEDTIME.     MAGNESIUM HYDROXIDE 400 mg/5 mL suspension TAKE 30ML BY MOUTH DAILY AS NEEDED.     artificial tears, hypromellose, (GONIOVISC) 2.5 % ophthalmic solution 2 drops each eye as needed     food supplemt, lactose-reduced (ENSURE COMPLETE) 0.05-1.5 gram-kcal/mL Liqd Take 1 Can by mouth 2 (two) times a day. Have 1-2 daily or protein powder equivalent     GAS RELIEF 80 mg chewable tablet CHEW 1 TABLET BY MOUTH 4 TIMES A DAY AS NEEDED FOR FLATULENCE     lidocaine (XYLOCAINE) 5 % ointment APPLY TO AFFECTED AREAS TWICE DAILY AS NEEDED FOR MUSCLE PAIN     Vitals:    12/11/17 1104   BP: 118/40   Pulse: (!) 49   Resp: 12   Temp: 98.8  F (37.1  C)   TempSrc: Oral   SpO2: 98%   Weight: (!) 74 lb 6.4 oz (33.7 kg)   Height: 4' 10\" (1.473 m)     PHYSICAL EXAM   General Appearance: Awake and alert, in no acute distress  HEENT: neck is supple  CV: regular rate  Resp: No respiratory distress. Breathing comfortably  Musculoskeletal: moving limbs comfortably with not deficits or deformities  Skin: no rashes noted    A/P  HTN - BP is wnl on atenolol    CKD stage 5  [facility] with [facility]  Taking renal cap  Started EPO in Jan 2017 and getting monthly for anemia but did not need one in August  Last apt 8/22/17 - CR 3.6, GFR 11  F/u on Oct 3 and 11/13/17   Per consult note:  Labs 11/13/17 CR 4.23/GFR 9, BUN 9  CKD stage 5 advised adequate hydration, if develops sx of uremia may consider hospice  Anemia - on epo  Acidosis - if CO2 persistently below 20, will start bicarb  Next renal apt 12/18/17    Vit D deficiency - taking ergo 71808 weekly      Osteoporosis with DEXA  10/31/16 showing high risk for fracture  On alendronate  Advised repeat in 1 year      C/o gassiness  H pylori neg on recheck   rx simethicone - ordered refills      Constipation - improved with milk of mag      Joint pain -   Tylenol, " gabapentin and lidogel     Malnutrition and unexplained weightloss but stable since last visit at 74#  Eating ok with protein and having ensure shake BID  Unclear etiology - DDX: CKD, cancer, poor absorption  Continue to monitor  Given her age and comorbidities, will not be too aggressive    Itchy dry eye  rx artificial tears    Spent 25 min face to face with patient with more the 50% spent in counseling, reviewing chart, and coordination of care and discussing problems listed above.

## 2021-06-15 NOTE — PROGRESS NOTES
HPI - 83 yo female here to f/u on CKD and HTN.       HTN - BP is wnl on atenolol     CKD stage 5  Co-managed with [facility]  Taking renal cap  Started EPO in Jan 2017 and getting monthly for anemia as needed  Last apt 12/18/17 - CR 3.6, GFR 11 and f/u tomorrow  Per consult note:  CKD stage 5 advised adequate hydration, if develops sx of uremia may consider hospice  Anemia - on epo   Acidosis - if CO2 persistently below 20, will start bicarb  She does not want dialysis.       Vit D deficiency - taking ergo 43787 weekly      Osteoporosis with DEXA  10/31/16 showing high risk for fracture  On alendronate  Advised repeat in 1 year      C/o gassiness - resolved with simethicone    Constipation - improved with milk of mag that she uses once a week (some weeks Q2days)      Joint pain -   Tylenol, gabapentin and lidogel      Malnutrition and unexplained weightloss but stable since last visit at 74#  Eating ok with protein and having ensure shake BID  Unclear etiology - DDX: CKD, cancer, poor absorption  Continue to monitor  Given her age and comorbidities, will not be too aggressive     Itchy dry eye  rx artificial tears    I have had an Advance Directives discussion with the patient.      Current Outpatient Prescriptions   Medication Sig     acetaminophen (MAPAP EXTRA STRENGTH) 500 MG tablet Take 1 tablet (500 mg total) by mouth every 6 (six) hours as needed for pain.     alendronate (FOSAMAX) 70 MG tablet TAKE 1 TABLET ORALLY EVERY 7 DAYS, TAKE IN THE MORNING ON AN EMPTY STOMACH WITH A FULL GLASS OF WATER 30 MINUTES BEFORE FOOD     ammonium lactate (LAC-HYDRIN) 12 % lotion APPLY TO DRY AREAS DAILY AS NEEDED     atenolol (TENORMIN) 100 MG tablet Take 1 tablet (100 mg total) by mouth daily.     B complex with C#20-folic acid (RENAL CAPS) 1 mg cap capsule TAKE 1 CAPSULE DAILY     cholecalciferol, vitamin D3, 400 unit Tab Take 2,000 Units by mouth daily. Nephrologist Rx. 4/06/2017     gabapentin  "(NEURONTIN) 100 MG capsule TAKE 1 CAPSULE BY MOUTH AT BEDTIME.     MAGNESIUM HYDROXIDE 400 mg/5 mL suspension TAKE 30ML BY MOUTH DAILY AS NEEDED.     simethicone (GAS RELIEF) 80 MG chewable tablet CHEW 1 TABLET BY MOUTH 4 TIMES A DAY AS NEEDED FOR FLATULENCE     artificial tears, hypromellose, (GONIOVISC) 2.5 % ophthalmic solution 2 drops each eye as needed     food supplemt, lactose-reduced (ENSURE COMPLETE) 0.05-1.5 gram-kcal/mL Liqd Take 1 Can by mouth 2 (two) times a day. Have 1-2 daily or protein powder equivalent     lidocaine (XYLOCAINE) 5 % ointment APPLY TO AFFECTED AREAS TWICE DAILY AS NEEDED FOR MUSCLE PAIN     Vitals:    02/05/18 1351 02/05/18 1354   BP: 144/60 142/60   Pulse: 77    Resp: 12    Temp: 97.7  F (36.5  C)    TempSrc: Oral    SpO2: 99%    Weight: (!) 73 lb 12.8 oz (33.5 kg)    Height: 4' 10\" (1.473 m)      OBJECTIVE:  Vitals listed above within normal limits  General appearance: well groomed, pleasant, well hydrated, nontoxic appearing  ENT: PERRL, throat clear  Neck: neck supple, no lymphadenopathy, no thyromegaly  Lungs: lungs clear to auscultation bilaterally, no wheezes or rhonchi  Heart: regular rate and rhythm, no murmurs, rubs or gallops  Abdomen: soft, nontender  Neuro: no focal deficits, CN II-XII grossly intact, alert and oriented  Psych:  mood stable, appears to have good insight and judgment    A/P  HTN - BP is wnl on atenolol with goal by renal SBP<160     CKD stage 5  [facility]-managed with [facility]  Taking renal cap  Started EPO in Jan 2017 and getting monthly for anemia as needed  Last apt 12/18/17 - CR 3.6, GFR 11 and f/u tomorrow 2/6/18  Per consult note:  CKD stage 5 advised adequate hydration, if develops sx of uremia may consider hospice  Anemia - on epo   Acidosis - if CO2 persistently below 20, will start bicarb       Vit D deficiency - taking ergo 33102 weekly      Osteoporosis with DEXA  10/31/16 showing high risk for fracture  On alendronate  Advised " repeat in 1 year      C/o gassiness - resolved with simethicone    Constipation - improved with milk of mag that she uses once a week (some weeks Q2days)  Will ask renal clinic to check Mg level with next blood draw      Joint pain -   Tylenol, gabapentin and lidogel      Malnutrition and unexplained weightloss but stable since last visit at 74#  Eating ok with protein and having ensure shake BID  Unclear etiology - DDX: CKD, cancer, poor absorption  Continue to monitor  Given her age and comorbidities, will not be too aggressive     Itchy dry eye  rx artificial tears    I have had an Advance Directives discussion with the patient for 30min   She does not want aggressive intervention and wants to die peacefully.   Likely wants to be DNR but she and son have some confusion about this concept.   Will continue to discuss.     Spent 30 for Adv care planning and 20min for other health issues all  face to face with patient with more the 50% spent in counseling, reviewing chart, and coordination of care and discussing problems listed above.

## 2021-06-16 PROBLEM — Z60.3 LANGUAGE BARRIER AFFECTING HEALTH CARE: Status: ACTIVE | Noted: 2019-01-01

## 2021-06-16 PROBLEM — M81.0 OSTEOPOROSIS: Status: ACTIVE | Noted: 2017-07-03

## 2021-06-16 PROBLEM — I10 HTN (HYPERTENSION): Status: ACTIVE | Noted: 2017-07-03

## 2021-06-16 PROBLEM — R14.0 GASSINESS: Status: ACTIVE | Noted: 2017-12-11

## 2021-06-16 PROBLEM — K52.9 GASTROENTERITIS: Status: ACTIVE | Noted: 2017-07-03

## 2021-06-16 PROBLEM — M62.81 GENERALIZED MUSCLE WEAKNESS: Status: ACTIVE | Noted: 2019-01-01

## 2021-06-16 PROBLEM — N18.5: Status: ACTIVE | Noted: 2017-07-03

## 2021-06-16 PROBLEM — Z59.6 POVERTY: Status: ACTIVE | Noted: 2019-01-01

## 2021-06-16 PROBLEM — Z78.9 MEDICALLY COMPLEX PATIENT: Status: ACTIVE | Noted: 2019-01-01

## 2021-06-16 PROBLEM — N18.5 STAGE 5 CHRONIC KIDNEY DISEASE NOT ON CHRONIC DIALYSIS (H): Status: ACTIVE | Noted: 2017-12-11

## 2021-06-16 PROBLEM — N17.9 ACUTE RENAL FAILURE (ARF) (H): Status: ACTIVE | Noted: 2019-01-01

## 2021-06-16 PROBLEM — Z91.81 AT HIGH RISK FOR INJURY RELATED TO FALL: Status: ACTIVE | Noted: 2019-01-01

## 2021-06-16 PROBLEM — Z75.8 LANGUAGE BARRIER AFFECTING HEALTH CARE: Status: ACTIVE | Noted: 2019-01-01

## 2021-06-16 PROBLEM — M25.50 JOINT PAIN: Status: ACTIVE | Noted: 2017-07-03

## 2021-06-16 PROBLEM — R53.83 OTHER FATIGUE: Status: ACTIVE | Noted: 2019-01-01

## 2021-06-17 NOTE — PROGRESS NOTES
HPI - 83 yo female here to f/u on CKD, dizziness, HTN and memory issues.     Memory concerns:  Per son, she asks the same questions repeatedly.  She gets her son and her grandson confused and sometimes thinks her grandson is her son.   She does not cook. After waking from a nap, she gets confused about day and night.   No audio/visual hallucations, no aggressive behaviors.   She shakes when she walks even with a walker, so she never goes outside alone.     HTN - BP is wnl on atenolol with goal by renal SBP<160      CKD stage 5  [facility] with [facility]  Taking renal cap  Started EPO in Jan 2017 and getting monthly for anemia as needed  Last apt 12/18/17 - CR 3.6, GFR 11 and f/u  2/6/18 and 3/7/18  Per consult note:  CKD stage 5 advised adequate hydration, if develops sx of uremia may consider hospice  Anemia - on epo   Acidosis - if CO2 persistently below 20, will start bicarb         Vit D deficiency - taking ergo 16411 weekly      Osteoporosis with DEXA  10/31/16 showing high risk for fracture  On alendronate  Advised repeat in 1 year      C/o gassiness - resolved with simethicone     Constipation   Resolved and no longer needs meds      Joint pain -   Tylenol, gabapentin and lidogel      Malnutrition and unexplained weightloss but stable since last visit at 74#  Eating ok with protein and having ensure shake BID  Unclear etiology - DDX: CKD, cancer, poor absorption  Continue to monitor  Given her age and comorbidities, will not be too aggressive      Itchy dry eye  rx artificial tears    Citizenship questions    Current Outpatient Prescriptions   Medication Sig     acetaminophen (MAPAP EXTRA STRENGTH) 500 MG tablet Take 1 tablet (500 mg total) by mouth every 6 (six) hours as needed for pain.     alendronate (FOSAMAX) 70 MG tablet TAKE 1 TABLET ORALLY EVERY 7 DAYS, TAKE IN THE MORNING ON AN EMPTY STOMACH WITH A FULL GLASS OF WATER 30 MINUTES BEFORE FOOD     ammonium lactate (LAC-HYDRIN) 12 %  "lotion APPLY TO DRY AREAS DAILY AS NEEDED     atenolol (TENORMIN) 100 MG tablet Take 1 tablet (100 mg total) by mouth daily.     B complex with C#20-folic acid (RENAL CAPS) 1 mg cap capsule TAKE 1 CAPSULE DAILY     cholecalciferol, vitamin D3, 400 unit Tab Take 2,000 Units by mouth daily. Nephrologist Rx. 4/06/2017     food supplemt, lactose-reduced (ENSURE COMPLETE) 0.05-1.5 gram-kcal/mL Liqd Take 1 Can by mouth 2 (two) times a day. Have 1-2 daily or protein powder equivalent     gabapentin (NEURONTIN) 100 MG capsule TAKE 1 CAPSULE BY MOUTH AT BEDTIME.     simethicone (GAS RELIEF) 80 MG chewable tablet CHEW 1 TABLET BY MOUTH 4 TIMES A DAY AS NEEDED FOR FLATULENCE     artificial tears, hypromellose, (GONIOVISC) 2.5 % ophthalmic solution 2 drops each eye as needed     lidocaine (XYLOCAINE) 5 % ointment APPLY TO AFFECTED AREAS TWICE DAILY AS NEEDED FOR MUSCLE PAIN     MAGNESIUM HYDROXIDE 400 mg/5 mL suspension TAKE 30ML BY MOUTH DAILY AS NEEDED.     Vitals:    04/05/18 1406   BP: 130/70   Pulse: 66   Resp: 16   Temp: 98.1  F (36.7  C)   TempSrc: Oral   SpO2: 99%   Weight: (!) 75 lb 11.2 oz (34.3 kg)   Height: 4' 10\" (1.473 m)     PHYSICAL EXAM   General Appearance: Awake and alert, in no acute distress  HEENT: neck is supple  CV: regular rate  Resp: No respiratory distress. Breathing comfortably  Musculoskeletal: moving limbs comfortably with not deficits or deformities  Skin: no rashes noted    A/P  HTN - BP is wnl on atenolol with goal by renal SBP<160      CKD stage 5  [facility]  Taking renal cap  Started EPO in Jan 2017 and getting monthly for anemia as needed  Last apt 12/18/17 - CR 3.6, GFR 11 and f/u  2/6/18 and 3/7/18  Per consult note:  CKD stage 5 advised adequate hydration, if develops sx of uremia may consider hospice  Anemia - on epo   Acidosis - if CO2 persistently below 20, will start bicarb         Vit D deficiency - taking ergo 80699 weekly      Osteoporosis with " DEXA  10/31/16 showing high risk for fracture  On alendronate  Advised repeat in 1 year      C/o gassiness - resolved with simethicone     Constipation   Resolved and no longer needs meds      Joint pain -   Tylenol, gabapentin and lidogel      Malnutrition and unexplained weightloss but stable since last visit at 74#  Eating ok with protein and having ensure shake BID  Unclear etiology - DDX: CKD, cancer, poor absorption  Continue to monitor  Given her age and comorbidities, will not be too aggressive      Itchy dry eye  rx artificial tears    Citizenship questions - given memory concerns, will refer for Referral to [facility] for neuropsych eval for possible eligibility for medical exemption for memory loss/cognitive impairment related to PTSD.       Spent 40 min face to face with patient with more the 50% spent in counseling, reviewing chart, and coordination of care and discussing problems listed above.

## 2021-06-18 NOTE — PROGRESS NOTES
HPI - 83 yo female here to discuss her DEXA scan.     DEXA 4/13/18:  SEVERE OSTEOPOROSIS and HIGH fracture risk currently receiving treatment with bisphosphonate therapy.    Recommendations:  Reevaluation of current therapy with determination of patient compliance with both medication, as well as vitamin D and calcium intake.  If patient is compliant, consider a switch to Prolia or an anabolic agent with follow up bone density scan in 1-2 years.    Vit D deficiency - taking ergo 86889 weekly    HTN - BP is wnl on atenolol with goal by renal SBP<160      CKD stage 5  Co-managed with [facility]    Memory loss and citizenship and neuropsych eval  Referral to [facility] for neuropsych eval for possible eligibility for medical exemption for memory loss/cognitive impairment related to PTSD.    Current Outpatient Prescriptions   Medication Sig     acetaminophen (MAPAP EXTRA STRENGTH) 500 MG tablet Take 1 tablet (500 mg total) by mouth every 6 (six) hours as needed for pain.     alendronate (FOSAMAX) 70 MG tablet TAKE 1 TABLET ORALLY EVERY 7 DAYS, TAKE IN THE MORNING ON AN EMPTY STOMACH WITH A FULL GLASS OF WATER 30 MINUTES BEFORE FOOD     ammonium lactate (LAC-HYDRIN) 12 % lotion APPLY TO DRY AREAS DAILY AS NEEDED     atenolol (TENORMIN) 100 MG tablet Take 1 tablet (100 mg total) by mouth daily.     B complex with C#20-folic acid (RENAL CAPS) 1 mg cap capsule TAKE 1 CAPSULE DAILY     cholecalciferol, vitamin D3, 400 unit Tab Take 2,000 Units by mouth daily. Nephrologist Rx. 4/06/2017     gabapentin (NEURONTIN) 100 MG capsule TAKE 1 CAPSULE BY MOUTH AT BEDTIME.     simethicone (GAS RELIEF) 80 MG chewable tablet CHEW 1 TABLET BY MOUTH 4 TIMES A DAY AS NEEDED FOR FLATULENCE     artificial tears, hypromellose, (GONIOVISC) 2.5 % ophthalmic solution 2 drops each eye as needed     food supplemt, lactose-reduced (ENSURE COMPLETE) 0.05-1.5 gram-kcal/mL Liqd Take 1 Can by mouth 2 (two) times a day.  "Have 1-2 daily or protein powder equivalent     lidocaine (XYLOCAINE) 5 % ointment APPLY TO AFFECTED AREAS TWICE DAILY AS NEEDED FOR MUSCLE PAIN     Vitals:    05/31/18 1319   BP: 110/60   Pulse: 63   Resp: 16   Temp: 98.1  F (36.7  C)   TempSrc: Oral   SpO2: 97%   Weight: (!) 75 lb 14.4 oz (34.4 kg)   Height: 4' 9.48\" (1.46 m)     PHYSICAL EXAM   General Appearance: Awake and alert, in no acute distress  HEENT: neck is supple  CV: regular rate  Resp: No respiratory distress. Breathing comfortably  Musculoskeletal: moving limbs comfortably with not deficits or deformities  Skin: no rashes noted    A/P  SEVERE OSTEOPOROSIS and HIGH fracture risk currently receiving treatment with bisphosphonate therapy.   Teaching with pictures  Patient is compliant, so we discussed switching to Prolia or an anabolic agent with follow up bone density scan in 1-2 years.   Stop alendronate.   She is not interested in shots (prolia or other).   Discussed changing to boniva but this is contraindicated in severe renal disease  Offered Referral for osteoporosis consult - declined    Vit D deficiency - taking ergo 54248 weekly    HTN - BP is wnl on atenolol with goal by renal SBP<160      CKD stage 5  [facility]    Questions about bills    Memory loss and citizenship and neuropsych eval  Referral to [facility] for neuropsych eval for possible eligibility for medical exemption for memory loss/cognitive impairment related to PTSD.  They had initial visit and needs f/u apt to review results.     Spent 40 min face to face with patient with more the 50% spent in counseling, reviewing chart, and coordination of care and discussing problems listed above.     "

## 2021-06-19 NOTE — LETTER
Letter by Mara Tom RN at      Author: Dah, Mara, RN Service: -- Author Type: --    Filed:  Encounter Date: 10/8/2019 Status: Signed       CARE COORDINATION    October 8, 2019  Gilda Ireland  [address]  [place] [address]      Dear Gilda,    I am a clinic care coordinator who works with Mago Sherman MD at [facility]. I wanted to thank you for spending the time to talk with me.  Below is a description of clinic care coordination and how I can further assist you.     The clinic care coordinator team is made up of a registered nurse,  and community health worker who understand the health care system. The goal of clinic care coordination is to help you manage your health and improve access to the health care system in the most efficient manner. The team can assist you in meeting your health care goals by providing education, coordinating services, strengthening the communication among your providers, assist you with any financial, behavioral, psychosocial, chemical dependency, counseling, and/or psychiatric resources if needed.    Please feel free to contact Dragan, the Community Health Worker, at [phone] with any questions or concerns. We are focused on providing you with the highest-quality healthcare experience possible and that all starts with you.     Sincerely,   Mara Tom  Enclosed: I have enclosed a copy of the Care Plan. This has helpful information and goals that we have talked about. Please keep this in an easy to access place to use as needed.

## 2021-06-19 NOTE — LETTER
Letter by Mara Tom RN at      Author: Dah, Mara, RN Service: -- Author Type: --    Filed:  Encounter Date: 10/8/2019 Status: Signed       Care Plan  About Me:    Patient Name:  Gilda Ireland    YOB: 1934  Age:         85 y.o.   Ha MRN:    [id number] Telephone Information:  Home Phone [phone]   Mobile Not on file.       Address:  [address] [place] [address] [address]  [facility] [address] Email address:  No e-mail address on record      Emergency Contact(s)  Extended Emergency Contact Information      Name: Daniel Menjivar    Relation: Child      Name: NONE, PER PT    Relation: Declined          Primary language:  Sanjuanita     needed? Yes   [place] [facility]:  [phone] op. 1  Other communication barriers: Language barrier, Lack of coping  Preferred Method of Communication:     Current living arrangement: I live in a private home with family  Mobility Status/ Medical Equipment: Dependent/Assisted by Another    Health Maintenance  Health Maintenance Reviewed: Not assessed    My Access Plan  Medical Emergency 911   Primary Clinic Line Mago Sherman MD - [phone]   24 Hour Appointment Line [phone] or  [phone] ([phone]) (toll-free)   24 Hour Nurse Line [phone] (toll-free)   Preferred Urgent Care [facility], [phone]   [facility]  [phone]   Preferred Pharmacy [facility] - [place], [place] - [address] Behavioral Health Crisis Line The National Suicide Prevention Lifeline at 1-572.198.7160 or 911             My Care Team Members  Patient Care Team       Relationship Specialty Notifications Start End    Mago Sherman MD PCP - General   3/13/14     Phone: [phone] Fax: [phone]         [address] [place] [address]    Omero Adame MD Physician Ophthalmology  10/25/16     [facility] [phone]    Phone: [phone] Fax: [phone] [facility] [address]    Jillian Marie PA-C Physician Assistant Nephrology  7/3/17     Phone: [phone] Fax: [phone]         [address] [place] [address] [address] [place] [address]    Antionette Cerda SW Lead Care Coordinator Primary Care - [facility] Admissions 12/31/18     Phone: [phone] Fax: [phone]        Mago Sherman MD Assigned PCP   7/28/19     Phone: [phone] Fax: [phone]         [address] [place] [address] [address] [place] [address]    Mara Tom, RN Lead Care Coordinator Primary Care - [facility] Ad[facility] 10/8/19     Fax: [phone]         Dragan Mercado CHW Community Health Worker Primary Care - [facility] Ad[facility] 10/8/19     Phone: [phone] Fax: [phone]                My Care Plans  Self Management and Treatment Plan  Goals and (Comments)  Goals        General    Son states: I would like my mom to establish care with home care in the next 7 days.  (pt-stated)     Notes - Note created  10/8/2019 12:20 PM by Mara Tom, RN    Action steps to achieve this goal  1.  Son will speak with CHW at outreach calls.  2. Son will answer the phone or return phone calls in a timely manner for start of care with home care  3. Son will call CHW with any concerns or questions.     Date goal set:  10/8/19               Advance Care Plans/Directives Type:        My Medical and Care Information  Problem List   Patient Active Problem List   Diagnosis   ? Menopause   ? Anemia   ? Cataract   ? Constipation   ? Blurry Vision   ? Renal Failure   ? Lower Back Pain   ? Dizziness   ? Fatigue   ? Serum Enzyme Levels - Alkaline Phosphatase Elevated   ? Itching (Pruritus)   ? Bloating   ? Myalgia and myositis, unspecified   ? Malnutrition due to renal disease (H)   ? Poor appetite   ? Headache   ? Chronic kidney failure, stage 5 (H)   ? HTN (hypertension)   ? Gastroenteritis   ? Osteoporosis   ? Joint pain   ? Stage 5 chronic kidney disease not on chronic dialysis (H)   ? Gassiness   ? Language barrier  affecting health care   ? Poverty   ? Medically complex patient   ? Acute renal failure (ARF) (H)      Current Medications and Allergies:  See printed Medication Report.    Care Coordination Start Date: 10/8/2019   Frequency of Care Coordination:     Form Last Updated: 10/11/2019

## 2021-06-20 NOTE — PROGRESS NOTES
HPI - 85 yo female here for f/u.     In general she is feeling good, not having pain, appetite is better, and energy is better.     SEVERE OSTEOPOROSIS and HIGH fracture risk currently receiving treatment with bisphosphonate therapy.   At her last apt on 5/17/18, we discussed switching to Prolia or an anabolic agent with follow up bone density scan in 1-2 years, stopped alendronate. She was not interested in shots (prolia or other).   Discussed changing to boniva but this is contraindicated in severe renal disease  Offered Referral for osteoporosis consult - declined     Vit D deficiency - taking ergo 43882 weekly     HTN - BP is wnl on atenolol with goal by renal SBP<160      CKD stage 5  Co-managed with [facility]  Last consult not was 5/9/18 and advised 5 week f/u    Malnutrition - - gaining weight with BMI still 16.2  Ensure is helping and her appetite is improving.     Anemia - previously getting epo from renal clinic      Memory loss and citizenship and neuropsych eval  Referral to [facility] for neuropsych eval for possible eligibility for medical exemption for memory loss/cognitive impairment related to PTSD.  They had initial visit on 5/29/18 and needs f/u apt to review results.       Current Outpatient Prescriptions   Medication Sig     acetaminophen (MAPAP EXTRA STRENGTH) 500 MG tablet Take 1 tablet (500 mg total) by mouth every 6 (six) hours as needed for pain.     ammonium lactate (LAC-HYDRIN) 12 % lotion APPLY TO DRY AREAS DAILY AS NEEDED     atenolol (TENORMIN) 50 MG tablet TAKE 2 TABLETS (100 MG TOTAL) BY MOUTH DAILY     cholecalciferol, vitamin D3, 400 unit Tab Take 2,000 Units by mouth daily. Nephrologist Rx. 4/06/2017     gabapentin (NEURONTIN) 100 MG capsule TAKE 1 CAPSULE BY MOUTH AT BEDTIME.     RENAL CAPS 1 mg cap capsule TAKE ONE CAPSULE BY MOUTH DAILY.     simethicone (GAS RELIEF) 80 MG chewable tablet CHEW 1 TABLET BY MOUTH 4 TIMES A DAY AS NEEDED FOR  "FLATULENCE     artificial tears, hypromellose, (GONIOVISC) 2.5 % ophthalmic solution 2 drops each eye as needed     atenolol (TENORMIN) 100 MG tablet Take 1 tablet (100 mg total) by mouth daily.     food supplemt, lactose-reduced (ENSURE COMPLETE) 0.05-1.5 gram-kcal/mL Liqd Take 1 Can by mouth 2 (two) times a day. Have 1-2 daily or protein powder equivalent     lidocaine (XYLOCAINE) 5 % ointment APPLY TO AFFECTED AREAS TWICE DAILY AS NEEDED FOR MUSCLE PAIN     Vitals:    09/04/18 1341   BP: 138/52   Patient Site: Left Arm   Patient Position: Sitting   Cuff Size: Adult Regular   Pulse: 72   Resp: 20   Temp: 98.2  F (36.8  C)   TempSrc: Oral   Weight: (!) 76 lb (34.5 kg)   Height: 4' 9.5\" (1.461 m)     PHYSICAL EXAM   General Appearance: Awake and alert, in no acute distress  HEENT: neck is supple  CV: regular rate  Resp: No respiratory distress. Breathing comfortably  Musculoskeletal: moving limbs comfortably with not deficits or deformities  Skin: no rashes noted    A/P  SEVERE OSTEOPOROSIS and HIGH fracture risk   s/p treatment with bisphosphonate therapy.   Not interested in  Prolia or osteoporosis consult      Vit D deficiency - taking ergo 65277 weekly     HTN - BP is wnl on atenolol 100mg with goal by renal SBP<160      CKD stage 5  Co-managed with [facility]  Needs f/u apt     Malnutrition - - gaining weight with BMI still 16.2  Ensure is helping and her appetite is improving.     Anemia - previously getting epo from renal clinic  Needs f/u     Memory loss and citizenship and neuropsych eval  Referral to [facility] for neuropsych eval for possible eligibility for medical exemption for memory loss/cognitive impairment related to PTSD.  They had initial visit on 5/29/18 and needs f/u apt to review results.     Spent 25 min face to face with patient with more the 50% spent in counseling, reviewing chart, and coordination of care and discussing problems listed above.     "

## 2021-06-21 NOTE — PROGRESS NOTES
HPI - 85 yo female here for f/u and to discuss form.     She had a medical exemption form N-648 [facility]. They need an additional letter from PCP.    Renal consult on 9/18/18 -  CKD and anemia and will continue to co-manage with me.   Anemia - previously getting epo from renal clinic    SEVERE OSTEOPOROSIS and HIGH fracture risk   s/p treatment with bisphosphonate therapy.   Not interested in  Prolia or osteoporosis consult       Vit D deficiency - taking ergo 81979 weekly      HTN - BP is wnl on atenolol 100mg with goal by renal SBP<160     Malnutrition - - gaining weight with BMI still 16.2  Ensure is helping and her appetite is improving.        Current Outpatient Prescriptions   Medication Sig     acetaminophen (MAPAP EXTRA STRENGTH) 500 MG tablet Take 1 tablet (500 mg total) by mouth every 6 (six) hours as needed for pain.     ammonium lactate (LAC-HYDRIN) 12 % lotion APPLY TO DRY AREAS DAILY AS NEEDED     artificial tears, hypromellose, (GONIOVISC) 2.5 % ophthalmic solution 2 drops each eye as needed     atenolol (TENORMIN) 100 MG tablet Take 1 tablet (100 mg total) by mouth daily.     B complex with C#20-folic acid (RENAL CAPS) 1 mg cap capsule Take 1 capsule by mouth daily.     cholecalciferol, vitamin D3, 400 unit Tab Take 2,000 Units by mouth daily. Nephrologist Rx. 4/06/2017     food supplemt, lactose-reduced (ENSURE COMPLETE) 0.05-1.5 gram-kcal/mL Liqd Take 1 Can by mouth 2 (two) times a day. Have 1-2 daily or protein powder equivalent     gabapentin (NEURONTIN) 100 MG capsule TAKE 1 CAPSULE BY MOUTH AT BEDTIME.     simethicone (GAS RELIEF) 80 MG chewable tablet CHEW 1 TABLET BY MOUTH 4 TIMES A DAY AS NEEDED FOR FLATULENCE     lidocaine (XYLOCAINE) 5 % ointment APPLY TO AFFECTED AREAS TWICE DAILY AS NEEDED FOR MUSCLE PAIN     Vitals:    10/23/18 0808   BP: 122/60   Pulse: 85   Resp: 12   Temp: 97.6  F (36.4  C)   TempSrc: Oral   SpO2: 98%   Weight: (!) 78 lb 12.8 oz  "(35.7 kg)   Height: 4' 9.52\" (1.461 m)     PHYSICAL EXAM   General Appearance: Awake and alert, in no acute distress  HEENT: neck is supple  CV: regular rate  Resp: No respiratory distress. Breathing comfortably  Musculoskeletal: moving limbs comfortably with not deficits or deformities  Skin: no rashes noted    A/P  Citizenship form -   Letter completed to accompany the N-648 - see in epic    Other issues stable          "

## 2021-06-22 NOTE — PROGRESS NOTES
HPI - 85 yo female here for f/u    Citizenship =  N648 done 9/12/18 and copy in media section of chart  Letter from PCP for referral in chart from 10/23/18    CKD stage 5  Renal consult on 11/19/18 - per consult note: no reversible factors and to consider hospice referral if uremic sx develop  CKD and anemia co-manage with renal      Anemia -  getting epo from renal clinic     SEVERE OSTEOPOROSIS and HIGH fracture risk   s/p treatment with bisphosphonate therapy.   Not interested in  Prolia or osteoporosis consult       Vit D deficiency - taking ergo 47518 weekly      HTN - BP is wnl on atenolol 100mg with goal by renal SBP<160     Malnutrition - - gaining weight with BMI still 16.2 on 9/4/18-> 16.9 today  Ensure is helping and her appetite is improving.     Itchy skin -   Using lac-hydrin    Current Outpatient Medications   Medication Sig     acetaminophen (MAPAP EXTRA STRENGTH) 500 MG tablet Take 1 tablet (500 mg total) by mouth every 6 (six) hours as needed for pain.     ammonium lactate (LAC-HYDRIN) 12 % lotion APPLY TO DRY AREAS DAILY AS NEEDED     atenolol (TENORMIN) 100 MG tablet Take 1 tablet (100 mg total) by mouth daily.     B complex with C#20-folic acid (RENAL CAPS) 1 mg cap capsule Take 1 capsule by mouth daily.     calcitriol (ROCALTROL) 0.25 MCG capsule TAKE ONE CAPSULE BY MOUTH ON MONDAYS AND THURSDAYS.     cholecalciferol, vitamin D3, 400 unit Tab Take 2,000 Units by mouth daily. Nephrologist Rx. 4/06/2017     food supplemt, lactose-reduced (ENSURE COMPLETE) 0.05-1.5 gram-kcal/mL Liqd Take 1 Can by mouth 2 (two) times a day. Have 1-2 daily or protein powder equivalent     gabapentin (NEURONTIN) 100 MG capsule TAKE 1 CAPSULE BY MOUTH AT BEDTIME.     simethicone (GAS RELIEF) 80 MG chewable tablet CHEW 1 TABLET BY MOUTH 4 TIMES A DAY AS NEEDED FOR FLATULENCE     artificial tears, hypromellose, (GONIOVISC) 2.5 % ophthalmic solution 2 drops each eye as needed     lidocaine (XYLOCAINE) 5 % ointment APPLY  "TO AFFECTED AREAS TWICE DAILY AS NEEDED FOR MUSCLE PAIN     Vitals:    12/04/18 1344   BP: 130/60   Pulse: 65   Resp: 12   Temp: 97.6  F (36.4  C)   TempSrc: Oral   SpO2: 99%   Weight: (!) 79 lb 6.4 oz (36 kg)   Height: 4' 9.52\" (1.461 m)     PHYSICAL EXAM   General Appearance: Awake and alert, in no acute distress  HEENT: neck is supple  CV: regular rate  Resp: No respiratory distress. Breathing comfortably  Musculoskeletal: moving limbs comfortably with not deficits or deformities  Skin: no rashes noted    A/P  CKD stage 5  Renal consult on 11/19/18 - per consult note: no reversible factors and to consider hospice referral if uremic sx develop  CKD and anemia co-manage with renal      Anemia -  getting epo from renal clinic     SEVERE OSTEOPOROSIS and HIGH fracture risk   s/p treatment with bisphosphonate therapy.   Not interested in  Prolia or osteoporosis consult       Vit D deficiency - taking ergo 01722 weekly      HTN -well controlled with atenolol 100mg     Malnutrition - - iimproving with Ensure    Spent 25 min face to face with patient with more the 50% spent in counseling, reviewing chart, and coordination of care and discussing problems listed above.     "

## 2021-06-23 NOTE — TELEPHONE ENCOUNTER
Refill Approved    Rx renewed per Medication Renewal Policy. Medication was last renewed on 1/21/18.    Fely Ryan, [facility] Triage/Med Refill 1/25/2019     Requested Prescriptions   Pending Prescriptions Disp Refills     gabapentin (NEURONTIN) 100 MG capsule [Pharmacy Med Name: GABAPENTIN 100 MG CAPSULE] 30 capsule 0     Sig: TAKE 1 CAPSULE BY MOUTH AT BEDTIME.    Gabapentin/Levetiracetam/Tiagabine Refill Protocol  Passed - 1/23/2019  1:20 PM       Passed - PCP or prescribing provider visit in past 12 months or next 3 months    Last office visit with prescriber/PCP: 12/4/2018 Mago Sherman MD OR same dept: 12/4/2018 Mago Sherman MD OR same specialty: 12/4/2018 Mago Sherman MD  Last physical: Visit date not found Last MTM visit: Visit date not found   Next visit within 3 mo: Visit date not found  Next physical within 3 mo: Visit date not found  Prescriber OR PCP: Mago Sherman MD  Last diagnosis associated with med order: 1. Lumbago  - gabapentin (NEURONTIN) 100 MG capsule [Pharmacy Med Name: GABAPENTIN 100 MG CAPSULE]; TAKE 1 CAPSULE BY MOUTH AT BEDTIME.  Dispense: 30 capsule; Refill: 0    If protocol passes may refill for 12 months if within 3 months of last provider visit (or a total of 15 months).

## 2021-06-23 NOTE — TELEPHONE ENCOUNTER
RN cannot approve Refill Request    RN can NOT refill this medication med is not covered by policy/route to provider. Last office visit: 12/4/2018 Mago Sherman MD Last Physical: Visit date not found Last MTM visit: Visit date not found Last visit same specialty: 12/4/2018 Mago Sherman MD.  Next visit within 3 mo: Visit date not found  Next physical within 3 mo: Visit date not found      Francine Mcclain, [facility] Triage/Med Refill 1/27/2019    Requested Prescriptions   Pending Prescriptions Disp Refills     calcitriol (ROCALTROL) 0.25 MCG capsule [Pharmacy Med Name: CALCITRIOL 0.25 MCG CAPSULE] 8 capsule 0     Sig: TAKE ONE CAPSULE BY MOUTH ON MONDAYS AND THURSDAYS    There is no refill protocol information for this order

## 2021-06-24 NOTE — TELEPHONE ENCOUNTER
RN cannot approve Refill Request    RN can NOT refill this medication med is not covered by policy/route to provider     . Last office visit: 12/4/2018 Mago Sherman MD Last Physical: Visit date not found Last MTM visit: Visit date not found Last visit same specialty: 12/4/2018 Mago Sherman MD.  Next visit within 3 mo: Visit date not found  Next physical within 3 mo: Visit date not found      Nicolette Beatty, [facility] [place] Connection Triage/Med Refill 2/28/2019    Requested Prescriptions   Pending Prescriptions Disp Refills     calcitriol (ROCALTROL) 0.25 MCG capsule [Pharmacy Med Name: CALCITRIOL 0.25 MCG CAPSULE] 8 capsule 0     Sig: TAKE ONE CAPSULE BY MOUTH ON MONDAYS AND THURSDAYS    There is no refill protocol information for this order        acetaminophen (TYLENOL) 500 MG tablet [Pharmacy Med Name: ACETAMINOPHEN 500 MG TABLET] 100 tablet 0     Sig: TAKE 1 TABLET (500MG TOTAL) BY MOUTH EVERY SIX HOURS AS NEEDED FOR PAIN.    There is no refill protocol information for this order

## 2021-06-28 NOTE — PROGRESS NOTES
Progress Notes by Mara Tom RN at 11/8/2019 11:00 AM     Author: Mara Tom RN Service: -- Author Type: Registered Nurse    Filed: 11/11/2019  9:13 AM Encounter Date: 11/8/2019 Status: Signed    : Mara Tom RN (Registered Nurse)       Clinic Care Coordination Contact    Follow Up Progress Note      Assessment:   Dragan Mercado, CHW 4 days ago         Situation(s):  CHW monthly outreach and follow up with patient.     Background:  Patient who has chronic health issues, language barrier and lack of community resources is enrolled with [facility] for coordination assistance and to get connected with community resources.     Assessment:  Patient completed all goals and currently has no new goal. Patient has PCA services and has family support for ongoing chronic health issues and patient is established with home care services.     Recommendation(s):  CCC RN please review patient's chart and advise for further steps with [facility] and update emergency if needed.             Chart review completed.     F/u needed:  1) Home care - started with  home care as of 11/4/19    2) Healthcare directive -mCHW to f/u on status - goal created.       3) Shower chairm-CHW to f/u on status - goal created.     Comments     Shower/bath chair with back. DX: ESRD stage 5 with extreme weakness/fatigue, wheelchair bound, malnutrition and high fall risk  HT: 4ft 11in  WT:72 #       4) Gloves - medium - CHW to f/u on status - goal created.     5) Incontinence supplies:  Comments     Order for Pull up diapers small - 150/mo and Gloves med gloves - box /mo  DX: ESRD stage 5 with extreme weakness/fatigue, wheelchair bound, malnutrition and high fall risk  HT: 4ft 11in  WT:72 #             Goals addressed this encounter:      Goals        Patient Stated    ? Son states: I need resources to fill out health care directive in the next 90 days.  (pt-stated)      Action steps to achieve this goal  1.  Son bea speak with CHW at outreach calls  2. Son and  patient will attend appt with SW either from home care or in clinic as needed   3. Son will call CHW with any concerns or questions.     Date goal set:  11/8/19      ? Son states: I need shower chair in the next  60 days.  (pt-stated)      Action steps to achieve this goal  1.  Son will speak with CHW at outreach calls  2. Son will call CHW with concerns or questions.     Date goal set:  11/8/19      ? Son states: I would like gloves ( M) and pull-ups (S) in the next 60 days.  (pt-stated)      Action steps to achieve this goal  1.  Son will speak with CHW at outreach calls  2. Son will answer phone or return calls in a timely manner  3. Son will call CHW with any concerns or questions.     Date goal set:  11/8/19                 Plan:   CHW to see goals.

## 2021-06-28 NOTE — PROGRESS NOTES
"Progress Notes by Mara Tom RN at 10/8/2019  3:00 PM     Author: Mara Tom RN Service: -- Author Type: Registered Nurse    Filed: 10/11/2019  9:59 AM Encounter Date: 10/8/2019 Status: Signed    : Mara Tom RN (Registered Nurse)       Clinic Care Coordination Contact    Clinic Care Coordination Contact  OUTREACH    Referral Information:  Referral Source: PCP    Primary Diagnosis: Behavioral Health    Chief Complaint   Patient presents with   ? Clinic Care Coordination - Initial     Clinic Utilization  Difficulty keeping appointments:: Yes  Compliance Concerns: No  No-Show Concerns: No  No PCP office visit in Past Year: No  Utilization    Last refreshed: 10/10/2019  7:03 AM:  Hospital Admissions 1           Last refreshed: 10/10/2019  7:03 AM:  ED Visits 0           Last refreshed: 10/10/2019  7:03 AM:  No Show Count (past year) 0              Current as of: 10/10/2019  7:03 AM              Clinical Concerns:  - Writer spoke with patient's son face to face today in clinic.     - Son states he would like to take care of his mom at home until the end of life. Son, Daily Menjivar and grand daughter, Con Castillo ( 4 hours) - PCA - 10.5 hours per day. Grand daughter lives on her own. States was told that there's no cure for patient if she declines dialysis.     - Lives in 2 bedroom apartment with son, TORSTEN, and 3 grand kids - 18 yr, 14, and 12 yr old.     - DME in home: cane, and wheelchair - states using a regular beatrice with back support as shower chair. States it's working out very well. Will have PT to eval home safety and appropriate DMEs.     - Son was completely against/reluctant to home care especially hospice in the beginning but agrees with  home care when he couldn't tell writer how he's has been giving patient medications-- skilled nursing and PT- A referral placed today for home care for skilled nursing, PT, OT and SW.     - Son states \" I can do everything on my own to take care of my mom. I don't even " "get enough sleep.\"     - Son states he doesn't want Hospice ever because he was told at the hospital that \" A hospice nurse will come and give his mom medication and she will die\". Explained to patient's there's much be some miss understanding what hospice is. Educated patient on Hospice and their main focus is to keep patient comfortable at the end of life. Hospice care addresses the patients physical, emotional, social and spiritual needs. Hospice care also helps the patients family caregivers. Patient's son was very adamant not wanting hospice even after education.       2) Healthcare Directive - son would like to look into this. Home care SW to assist or may need CCC SW    3) Need f/u appt with PCP post hospitalization - states patient too weak to come in to see PCP but she already has appt scheduled in December.       Current Medical Concerns:    Pain  Pain (GOAL):: No  Health Maintenance Reviewed: Not assessed       Medication Management:  Son manages meds. Didn't bring in any meds today.   Unable to vannesa writer the name of the medications but states patient takes one bed for pain, one med for     Functional Status:  Dependent ADLs:: Wheelchair-with assist  Dependent IADLs:: Cleaning, Cooking, Laundry, Meal Preparation, Medication Management, Money Management, Transportation, Incontinence  Bed or wheelchair confined:: Yes  Mobility Status: Dependent/Assisted by Another  Fallen 2 or more times in the past year?: No  Any fall with injury in the past year?: No    Living Situation:  Current living arrangement:: I live in a private home with family  Type of residence:: Apartment    Diet/Exercise/Sleep:  Diet:: Regular  Inadequate nutrition (GOAL):: No  Food Insecurity: No  Tube Feeding: No  Exercise:: Currently not exercising  Inadequate activity/exercise (GOAL):: No  Significant changes in sleep pattern (GOAL): No    Transportation:  Transportation concerns (GOAL):: No  Transportation means:: Medical transport, " Regular car     Psychosocial:  Yazidism or spiritual beliefs that impact treatment:: No  Mental health DX:: No  Mental health management concern (GOAL):: No  Informal Support system:: Children     Financial/Insurance:   Financial/Insurance concerns (GOAL):: No     Resources and Interventions:  Current Resources:    ;   Community Resources: None  Supplies used at home:: Incontinence Supplies  Equipment Currently Used at Home: wheelchair, manual    Advance Care Plan/Directive  Advanced Care Plans/Directives on file:: No    Referrals Placed: Home Care     Goals:   Goals        General    Son states: I would like my mom to establish care with home care in the next 7 days.  (pt-stated)     Notes - Note created  10/8/2019 12:20 PM by Mara Tom RN    Action steps to achieve this goal  1.  Son will speak with CHW at outreach calls.  2. Son will answer the phone or return phone calls in a timely manner for start of care with home care  3. Son will call CHW with any concerns or questions.     Date goal set:  10/8/19               Future Appointments              In 1 month Mago Sherman MD [facility]          Plan:   1) CHW to check on home care status  2) [facility] RN to f/u in 1 month.             -

## 2021-07-03 NOTE — ADDENDUM NOTE
Addendum Note by Myranda Mahoney MD at 10/23/2018  8:44 AM     Author: Myranda Mahoney MD Service: -- Author Type: Physician    Filed: 10/23/2018  8:44 AM Encounter Date: 10/23/2018 Status: Signed    : Myranda Mahoney MD (Physician)    Addended by: MYRANDA MAHONEY on: 10/23/2018 08:44 AM        Modules accepted: Orders